# Patient Record
Sex: MALE | Race: BLACK OR AFRICAN AMERICAN | NOT HISPANIC OR LATINO | URBAN - METROPOLITAN AREA
[De-identification: names, ages, dates, MRNs, and addresses within clinical notes are randomized per-mention and may not be internally consistent; named-entity substitution may affect disease eponyms.]

---

## 2024-01-01 ENCOUNTER — SOCIAL WORK (OUTPATIENT)
Dept: ADMINISTRATIVE | Facility: OTHER | Age: 44
End: 2024-01-01
Payer: MEDICAID

## 2024-01-01 ENCOUNTER — TELEPHONE (OUTPATIENT)
Dept: HEPATOLOGY | Facility: CLINIC | Age: 44
End: 2024-01-01
Payer: MEDICAID

## 2024-01-01 ENCOUNTER — HOSPITAL ENCOUNTER (INPATIENT)
Facility: HOSPITAL | Age: 44
LOS: 11 days | Discharge: HOME | DRG: 840 | End: 2024-03-28
Attending: INTERNAL MEDICINE | Admitting: INTERNAL MEDICINE
Payer: MEDICAID

## 2024-01-01 ENCOUNTER — TELEPHONE (OUTPATIENT)
Dept: INTERVENTIONAL RADIOLOGY/VASCULAR | Facility: CLINIC | Age: 44
End: 2024-01-01
Payer: MEDICAID

## 2024-01-01 ENCOUNTER — DOCUMENTATION ONLY (OUTPATIENT)
Dept: HEMATOLOGY/ONCOLOGY | Facility: CLINIC | Age: 44
End: 2024-01-01
Payer: MEDICAID

## 2024-01-01 ENCOUNTER — DOCUMENTATION ONLY (OUTPATIENT)
Dept: ONCOLOGY | Facility: HOSPITAL | Age: 44
End: 2024-01-01
Payer: MEDICAID

## 2024-01-01 VITALS
HEIGHT: 71 IN | WEIGHT: 154.63 LBS | RESPIRATION RATE: 18 BRPM | TEMPERATURE: 98 F | OXYGEN SATURATION: 100 % | SYSTOLIC BLOOD PRESSURE: 142 MMHG | HEART RATE: 89 BPM | BODY MASS INDEX: 21.65 KG/M2 | DIASTOLIC BLOOD PRESSURE: 90 MMHG

## 2024-01-01 DIAGNOSIS — R00.0 TACHYCARDIA: ICD-10-CM

## 2024-01-01 DIAGNOSIS — B16.9 ACUTE VIRAL HEPATITIS B WITHOUT COMA AND WITHOUT DELTA AGENT: Primary | ICD-10-CM

## 2024-01-01 DIAGNOSIS — E87.5 HYPERKALEMIA: ICD-10-CM

## 2024-01-01 DIAGNOSIS — C95.90 LEUKEMIA: ICD-10-CM

## 2024-01-01 DIAGNOSIS — C91.50 ADULT T-CELL LEUKEMIA/LYMPHOMA, NOT IN REMISSION: Primary | ICD-10-CM

## 2024-01-01 DIAGNOSIS — R07.9 CHEST PAIN: ICD-10-CM

## 2024-01-01 DIAGNOSIS — C91.50: ICD-10-CM

## 2024-01-01 DIAGNOSIS — C91.50 ADULT T-CELL LEUKEMIA/LYMPHOMA, NOT IN REMISSION: ICD-10-CM

## 2024-01-01 DIAGNOSIS — C95.00 ACUTE LEUKEMIA NOT HAVING ACHIEVED REMISSION: Primary | ICD-10-CM

## 2024-01-01 DIAGNOSIS — C95.00 ACUTE LEUKEMIA: ICD-10-CM

## 2024-01-01 DIAGNOSIS — B18.1 CHRONIC VIRAL HEPATITIS B WITHOUT DELTA AGENT AND WITHOUT COMA: ICD-10-CM

## 2024-01-01 LAB
ABO + RH BLD: NORMAL
AFP SERPL-MCNC: 2.9 NG/ML (ref 0–8.4)
ALBUMIN SERPL BCP-MCNC: 2.6 G/DL (ref 3.5–5.2)
ALBUMIN SERPL BCP-MCNC: 2.7 G/DL (ref 3.5–5.2)
ALBUMIN SERPL BCP-MCNC: 2.8 G/DL (ref 3.5–5.2)
ALBUMIN SERPL BCP-MCNC: 2.9 G/DL (ref 3.5–5.2)
ALBUMIN SERPL BCP-MCNC: 3.1 G/DL (ref 3.5–5.2)
ALLENS TEST: ABNORMAL
ALP SERPL-CCNC: 147 U/L (ref 55–135)
ALP SERPL-CCNC: 149 U/L (ref 55–135)
ALP SERPL-CCNC: 160 U/L (ref 55–135)
ALP SERPL-CCNC: 173 U/L (ref 55–135)
ALP SERPL-CCNC: 188 U/L (ref 55–135)
ALP SERPL-CCNC: 197 U/L (ref 55–135)
ALP SERPL-CCNC: 204 U/L (ref 55–135)
ALP SERPL-CCNC: 210 U/L (ref 55–135)
ALP SERPL-CCNC: 212 U/L (ref 55–135)
ALP SERPL-CCNC: 268 U/L (ref 55–135)
ALP SERPL-CCNC: 276 U/L (ref 55–135)
ALP SERPL-CCNC: 290 U/L (ref 55–135)
ALP SERPL-CCNC: 295 U/L (ref 55–135)
ALP SERPL-CCNC: 313 U/L (ref 55–135)
ALT SERPL W/O P-5'-P-CCNC: 158 U/L (ref 10–44)
ALT SERPL W/O P-5'-P-CCNC: 38 U/L (ref 10–44)
ALT SERPL W/O P-5'-P-CCNC: 38 U/L (ref 10–44)
ALT SERPL W/O P-5'-P-CCNC: 48 U/L (ref 10–44)
ALT SERPL W/O P-5'-P-CCNC: 49 U/L (ref 10–44)
ALT SERPL W/O P-5'-P-CCNC: 50 U/L (ref 10–44)
ALT SERPL W/O P-5'-P-CCNC: 57 U/L (ref 10–44)
ALT SERPL W/O P-5'-P-CCNC: 62 U/L (ref 10–44)
ALT SERPL W/O P-5'-P-CCNC: 67 U/L (ref 10–44)
ALT SERPL W/O P-5'-P-CCNC: 69 U/L (ref 10–44)
ALT SERPL W/O P-5'-P-CCNC: 78 U/L (ref 10–44)
ALT SERPL W/O P-5'-P-CCNC: 84 U/L (ref 10–44)
ALT SERPL W/O P-5'-P-CCNC: 91 U/L (ref 10–44)
ALT SERPL W/O P-5'-P-CCNC: 97 U/L (ref 10–44)
AML FISH REASON FOR REFERRAL (BLD): NORMAL
ANION GAP SERPL CALC-SCNC: 11 MMOL/L (ref 8–16)
ANION GAP SERPL CALC-SCNC: 13 MMOL/L (ref 8–16)
ANION GAP SERPL CALC-SCNC: 3 MMOL/L (ref 8–16)
ANION GAP SERPL CALC-SCNC: 4 MMOL/L (ref 8–16)
ANION GAP SERPL CALC-SCNC: 4 MMOL/L (ref 8–16)
ANION GAP SERPL CALC-SCNC: 5 MMOL/L (ref 8–16)
ANION GAP SERPL CALC-SCNC: 5 MMOL/L (ref 8–16)
ANION GAP SERPL CALC-SCNC: 7 MMOL/L (ref 8–16)
ANION GAP SERPL CALC-SCNC: 7 MMOL/L (ref 8–16)
ANION GAP SERPL CALC-SCNC: 8 MMOL/L (ref 8–16)
ANION GAP SERPL CALC-SCNC: 8 MMOL/L (ref 8–16)
ANION GAP SERPL CALC-SCNC: 9 MMOL/L (ref 8–16)
ANISOCYTOSIS BLD QL SMEAR: SLIGHT
ANNOTATION COMMENT IMP: NORMAL
ANNOTATION COMMENT IMP: NORMAL
APTT PPP: 21.1 SEC (ref 21–32)
APTT PPP: 22.1 SEC (ref 21–32)
APTT PPP: 22.1 SEC (ref 21–32)
APTT PPP: 24.7 SEC (ref 21–32)
APTT PPP: 27.5 SEC (ref 21–32)
APTT PPP: 28.6 SEC (ref 21–32)
APTT PPP: 29.2 SEC (ref 21–32)
APTT PPP: 37.3 SEC (ref 21–32)
ASCENDING AORTA: 3.76 CM
AST SERPL-CCNC: 100 U/L (ref 10–40)
AST SERPL-CCNC: 104 U/L (ref 10–40)
AST SERPL-CCNC: 136 U/L (ref 10–40)
AST SERPL-CCNC: 214 U/L (ref 10–40)
AST SERPL-CCNC: 252 U/L (ref 10–40)
AST SERPL-CCNC: 303 U/L (ref 10–40)
AST SERPL-CCNC: 303 U/L (ref 10–40)
AST SERPL-CCNC: 69 U/L (ref 10–40)
AST SERPL-CCNC: 69 U/L (ref 10–40)
AST SERPL-CCNC: 71 U/L (ref 10–40)
AST SERPL-CCNC: 73 U/L (ref 10–40)
AST SERPL-CCNC: 75 U/L (ref 10–40)
AST SERPL-CCNC: 76 U/L (ref 10–40)
AST SERPL-CCNC: 92 U/L (ref 10–40)
AV INDEX (PROSTH): 0.82
AV MEAN GRADIENT: 6 MMHG
AV PEAK GRADIENT: 12 MMHG
AV VALVE AREA BY VELOCITY RATIO: 3.65 CM²
AV VALVE AREA: 3.68 CM²
AV VELOCITY RATIO: 0.81
BASOPHILS # BLD AUTO: ABNORMAL K/UL (ref 0–0.2)
BASOPHILS NFR BLD: 0 % (ref 0–1.9)
BILIRUB DIRECT SERPL-MCNC: 0.6 MG/DL (ref 0.1–0.3)
BILIRUB DIRECT SERPL-MCNC: 0.8 MG/DL (ref 0.1–0.3)
BILIRUB DIRECT SERPL-MCNC: 0.8 MG/DL (ref 0.1–0.3)
BILIRUB DIRECT SERPL-MCNC: 1.3 MG/DL (ref 0.1–0.3)
BILIRUB SERPL-MCNC: 1 MG/DL (ref 0.1–1)
BILIRUB SERPL-MCNC: 1.1 MG/DL (ref 0.1–1)
BILIRUB SERPL-MCNC: 1.2 MG/DL (ref 0.1–1)
BILIRUB SERPL-MCNC: 1.3 MG/DL (ref 0.1–1)
BILIRUB SERPL-MCNC: 1.4 MG/DL (ref 0.1–1)
BILIRUB SERPL-MCNC: 1.5 MG/DL (ref 0.1–1)
BILIRUB SERPL-MCNC: 1.5 MG/DL (ref 0.1–1)
BILIRUB SERPL-MCNC: 1.8 MG/DL (ref 0.1–1)
BILIRUB SERPL-MCNC: 1.8 MG/DL (ref 0.1–1)
BILIRUB SERPL-MCNC: 2 MG/DL (ref 0.1–1)
BILIRUB SERPL-MCNC: 2.3 MG/DL (ref 0.1–1)
BILIRUB SERPL-MCNC: 2.4 MG/DL (ref 0.1–1)
BLD GP AB SCN CELLS X3 SERPL QL: NORMAL
BLD PROD TYP BPU: NORMAL
BLD PROD TYP BPU: NORMAL
BLOOD UNIT EXPIRATION DATE: NORMAL
BLOOD UNIT EXPIRATION DATE: NORMAL
BLOOD UNIT TYPE CODE: 5100
BLOOD UNIT TYPE CODE: 5100
BLOOD UNIT TYPE: NORMAL
BLOOD UNIT TYPE: NORMAL
BODY SITE - BONE MARROW: NORMAL
BUN SERPL-MCNC: 10 MG/DL (ref 6–20)
BUN SERPL-MCNC: 13 MG/DL (ref 6–20)
BUN SERPL-MCNC: 15 MG/DL (ref 6–20)
BUN SERPL-MCNC: 17 MG/DL (ref 6–20)
BUN SERPL-MCNC: 18 MG/DL (ref 6–20)
BUN SERPL-MCNC: 19 MG/DL (ref 6–20)
BUN SERPL-MCNC: 20 MG/DL (ref 6–20)
BUN SERPL-MCNC: 21 MG/DL (ref 6–20)
BUN SERPL-MCNC: 22 MG/DL (ref 6–20)
BUN SERPL-MCNC: 23 MG/DL (ref 6–20)
BUN SERPL-MCNC: 28 MG/DL (ref 6–20)
BUN SERPL-MCNC: 33 MG/DL (ref 6–20)
BUN SERPL-MCNC: 38 MG/DL (ref 6–20)
BUN SERPL-MCNC: 6 MG/DL (ref 6–20)
BUN SERPL-MCNC: 8 MG/DL (ref 6–20)
BURR CELLS BLD QL SMEAR: ABNORMAL
BURR CELLS BLD QL SMEAR: ABNORMAL
CALCIUM SERPL-MCNC: 10.1 MG/DL (ref 8.7–10.5)
CALCIUM SERPL-MCNC: 10.4 MG/DL (ref 8.7–10.5)
CALCIUM SERPL-MCNC: 13 MG/DL (ref 8.7–10.5)
CALCIUM SERPL-MCNC: 6.2 MG/DL (ref 8.7–10.5)
CALCIUM SERPL-MCNC: 7.2 MG/DL (ref 8.7–10.5)
CALCIUM SERPL-MCNC: 7.2 MG/DL (ref 8.7–10.5)
CALCIUM SERPL-MCNC: 7.9 MG/DL (ref 8.7–10.5)
CALCIUM SERPL-MCNC: 8.5 MG/DL (ref 8.7–10.5)
CALCIUM SERPL-MCNC: 8.5 MG/DL (ref 8.7–10.5)
CALCIUM SERPL-MCNC: 8.8 MG/DL (ref 8.7–10.5)
CALCIUM SERPL-MCNC: 8.9 MG/DL (ref 8.7–10.5)
CALCIUM SERPL-MCNC: 9 MG/DL (ref 8.7–10.5)
CALCIUM SERPL-MCNC: 9 MG/DL (ref 8.7–10.5)
CALCIUM SERPL-MCNC: 9.4 MG/DL (ref 8.7–10.5)
CALCIUM SERPL-MCNC: 9.8 MG/DL (ref 8.7–10.5)
CALCIUM SERPL-MCNC: 9.9 MG/DL (ref 8.7–10.5)
CELLS W CYTOGENETIC ABNL BLD/T: NORMAL
CHLORIDE SERPL-SCNC: 101 MMOL/L (ref 95–110)
CHLORIDE SERPL-SCNC: 102 MMOL/L (ref 95–110)
CHLORIDE SERPL-SCNC: 104 MMOL/L (ref 95–110)
CHLORIDE SERPL-SCNC: 107 MMOL/L (ref 95–110)
CHLORIDE SERPL-SCNC: 108 MMOL/L (ref 95–110)
CHLORIDE SERPL-SCNC: 109 MMOL/L (ref 95–110)
CHLORIDE SERPL-SCNC: 113 MMOL/L (ref 95–110)
CHLORIDE SERPL-SCNC: 93 MMOL/L (ref 95–110)
CHLORIDE SERPL-SCNC: 95 MMOL/L (ref 95–110)
CHLORIDE SERPL-SCNC: 95 MMOL/L (ref 95–110)
CHLORIDE SERPL-SCNC: 98 MMOL/L (ref 95–110)
CHLORIDE SERPL-SCNC: 98 MMOL/L (ref 95–110)
CHLORIDE SERPL-SCNC: 99 MMOL/L (ref 95–110)
CHROM ANALY RESULT (ISCN): NORMAL
CHROM BANDING METHOD: NORMAL
CHROMOSOME ANALYSIS BM ADDITIONAL INFORMATION: NORMAL
CHROMOSOME ANALYSIS BM RELEASED BY: NORMAL
CHROMOSOME ANALYSIS BM RESULT SUMMARY: NORMAL
CLINICAL BIOCHEMIST REVIEW: NORMAL
CLINICAL CYTOGENETICIST REVIEW: NORMAL
CLINICAL CYTOGENETICIST REVIEW: NORMAL
CLINICAL DIAGNOSIS - BONE MARROW: NORMAL
CO2 SERPL-SCNC: 21 MMOL/L (ref 23–29)
CO2 SERPL-SCNC: 21 MMOL/L (ref 23–29)
CO2 SERPL-SCNC: 22 MMOL/L (ref 23–29)
CO2 SERPL-SCNC: 23 MMOL/L (ref 23–29)
CO2 SERPL-SCNC: 25 MMOL/L (ref 23–29)
CO2 SERPL-SCNC: 25 MMOL/L (ref 23–29)
CO2 SERPL-SCNC: 26 MMOL/L (ref 23–29)
CO2 SERPL-SCNC: 27 MMOL/L (ref 23–29)
CO2 SERPL-SCNC: 28 MMOL/L (ref 23–29)
CO2 SERPL-SCNC: 30 MMOL/L (ref 23–29)
CO2 SERPL-SCNC: 30 MMOL/L (ref 23–29)
CO2 SERPL-SCNC: 31 MMOL/L (ref 23–29)
CODING SYSTEM: NORMAL
CODING SYSTEM: NORMAL
COMMENT: NORMAL
CREAT SERPL-MCNC: 0.6 MG/DL (ref 0.5–1.4)
CREAT SERPL-MCNC: 0.7 MG/DL (ref 0.5–1.4)
CREAT SERPL-MCNC: 0.8 MG/DL (ref 0.5–1.4)
CROSSMATCH INTERPRETATION: NORMAL
CROSSMATCH INTERPRETATION: NORMAL
CV ECHO LV RWT: 0.25 CM
DELSYS: ABNORMAL
DIFFERENTIAL METHOD BLD: ABNORMAL
DISPENSE STATUS: NORMAL
DISPENSE STATUS: NORMAL
DOP CALC AO PEAK VEL: 1.72 M/S
DOP CALC AO VTI: 29.06 CM
DOP CALC LVOT AREA: 4.5 CM2
DOP CALC LVOT DIAMETER: 2.39 CM
DOP CALC LVOT PEAK VEL: 1.4 M/S
DOP CALC LVOT STROKE VOLUME: 106.81 CM3
DOP CALCLVOT PEAK VEL VTI: 23.82 CM
DX: NORMAL
E WAVE DECELERATION TIME: 154.51 MSEC
E/A RATIO: 1.45
E/E' RATIO: 7.75 M/S
ECHO LV POSTERIOR WALL: 0.63 CM (ref 0.6–1.1)
EOSINOPHIL # BLD AUTO: ABNORMAL K/UL (ref 0–0.5)
EOSINOPHIL NFR BLD: 0 % (ref 0–8)
EOSINOPHIL NFR BLD: 1 % (ref 0–8)
EOSINOPHIL NFR BLD: 2 % (ref 0–8)
EOSINOPHIL NFR BLD: 3 % (ref 0–8)
EOSINOPHIL NFR BLD: 4 % (ref 0–8)
EOSINOPHIL NFR BLD: 4 % (ref 0–8)
EOSINOPHIL NFR BLD: 6 % (ref 0–8)
EOSINOPHIL NFR BLD: 7 % (ref 0–8)
ERYTHROCYTE [DISTWIDTH] IN BLOOD BY AUTOMATED COUNT: 13.6 % (ref 11.5–14.5)
ERYTHROCYTE [DISTWIDTH] IN BLOOD BY AUTOMATED COUNT: 13.8 % (ref 11.5–14.5)
ERYTHROCYTE [DISTWIDTH] IN BLOOD BY AUTOMATED COUNT: 13.8 % (ref 11.5–14.5)
ERYTHROCYTE [DISTWIDTH] IN BLOOD BY AUTOMATED COUNT: 13.9 % (ref 11.5–14.5)
ERYTHROCYTE [DISTWIDTH] IN BLOOD BY AUTOMATED COUNT: 14 % (ref 11.5–14.5)
ERYTHROCYTE [DISTWIDTH] IN BLOOD BY AUTOMATED COUNT: 14.1 % (ref 11.5–14.5)
ERYTHROCYTE [DISTWIDTH] IN BLOOD BY AUTOMATED COUNT: 14.2 % (ref 11.5–14.5)
ERYTHROCYTE [DISTWIDTH] IN BLOOD BY AUTOMATED COUNT: 14.6 % (ref 11.5–14.5)
ERYTHROCYTE [DISTWIDTH] IN BLOOD BY AUTOMATED COUNT: 14.6 % (ref 11.5–14.5)
ERYTHROCYTE [DISTWIDTH] IN BLOOD BY AUTOMATED COUNT: 14.7 % (ref 11.5–14.5)
ERYTHROCYTE [DISTWIDTH] IN BLOOD BY AUTOMATED COUNT: 14.8 % (ref 11.5–14.5)
ERYTHROCYTE [DISTWIDTH] IN BLOOD BY AUTOMATED COUNT: 15.2 % (ref 11.5–14.5)
ERYTHROCYTE [DISTWIDTH] IN BLOOD BY AUTOMATED COUNT: 15.5 % (ref 11.5–14.5)
EST. GFR  (NO RACE VARIABLE): >60 ML/MIN/1.73 M^2
FIBRINOGEN PPP-MCNC: 125 MG/DL (ref 182–400)
FIBRINOGEN PPP-MCNC: 150 MG/DL (ref 182–400)
FIBRINOGEN PPP-MCNC: 155 MG/DL (ref 182–400)
FIBRINOGEN PPP-MCNC: 160 MG/DL (ref 182–400)
FIBRINOGEN PPP-MCNC: 162 MG/DL (ref 182–400)
FIBRINOGEN PPP-MCNC: 174 MG/DL (ref 182–400)
FIBRINOGEN PPP-MCNC: 183 MG/DL (ref 182–400)
FIBRINOGEN PPP-MCNC: 185 MG/DL (ref 182–400)
FIBRINOGEN PPP-MCNC: 188 MG/DL (ref 182–400)
FIBRINOGEN PPP-MCNC: 195 MG/DL (ref 182–400)
FIBRINOGEN PPP-MCNC: 203 MG/DL (ref 182–400)
FIBRINOGEN PPP-MCNC: 206 MG/DL (ref 182–400)
FINAL PATHOLOGIC DIAGNOSIS: NORMAL
FIO2: 21
FIO2: 21
FLOW CYTOMETRY ANTIBODIES ANALYZED - BONE MARROW: NORMAL
FLOW CYTOMETRY COMMENT - BONE MARROW: NORMAL
FLOW CYTOMETRY INTERPRETATION - BONE MARROW: NORMAL
FLT3 RESULT: NORMAL
FRACTIONAL SHORTENING: 32 % (ref 28–44)
G6PD RBC-CCNT: 1 U/G HB (ref 8–11.9)
GLOBAL LONGITUIDAL STRAIN: 20 %
GLUCOSE SERPL-MCNC: 105 MG/DL (ref 70–110)
GLUCOSE SERPL-MCNC: 105 MG/DL (ref 70–110)
GLUCOSE SERPL-MCNC: 111 MG/DL (ref 70–110)
GLUCOSE SERPL-MCNC: 115 MG/DL (ref 70–110)
GLUCOSE SERPL-MCNC: 130 MG/DL (ref 70–110)
GLUCOSE SERPL-MCNC: 142 MG/DL (ref 70–110)
GLUCOSE SERPL-MCNC: 142 MG/DL (ref 70–110)
GLUCOSE SERPL-MCNC: 144 MG/DL (ref 70–110)
GLUCOSE SERPL-MCNC: 156 MG/DL (ref 70–110)
GLUCOSE SERPL-MCNC: 168 MG/DL (ref 70–110)
GLUCOSE SERPL-MCNC: 194 MG/DL (ref 70–110)
GLUCOSE SERPL-MCNC: 41 MG/DL (ref 70–110)
GLUCOSE SERPL-MCNC: 62 MG/DL (ref 70–110)
GLUCOSE SERPL-MCNC: 73 MG/DL (ref 70–110)
GLUCOSE SERPL-MCNC: 75 MG/DL (ref 70–110)
GLUCOSE SERPL-MCNC: 78 MG/DL (ref 70–110)
GLUCOSE SERPL-MCNC: 80 MG/DL (ref 70–110)
GLUCOSE SERPL-MCNC: 81 MG/DL (ref 70–110)
GLUCOSE SERPL-MCNC: 88 MG/DL (ref 70–110)
GROSS: NORMAL
HAV IGG SER QL IA: REACTIVE
HAV IGM SERPL QL IA: NORMAL
HBV CORE AB SERPL QL IA: REACTIVE
HBV E AB SER QL: REACTIVE
HBV E AG SERPL QL IA: NONREACTIVE
HBV SURFACE AG SERPL QL IA: REACTIVE
HBV SURFACE AG SERPL QL NT: ABNORMAL
HCO3 UR-SCNC: 23.3 MMOL/L (ref 24–28)
HCO3 UR-SCNC: 23.3 MMOL/L (ref 24–28)
HCO3 UR-SCNC: 24.7 MMOL/L (ref 24–28)
HCO3 UR-SCNC: 24.7 MMOL/L (ref 24–28)
HCO3 UR-SCNC: 25.5 MMOL/L (ref 24–28)
HCT VFR BLD AUTO: 29.3 % (ref 40–54)
HCT VFR BLD AUTO: 30.1 % (ref 40–54)
HCT VFR BLD AUTO: 32 % (ref 40–54)
HCT VFR BLD AUTO: 32.1 % (ref 40–54)
HCT VFR BLD AUTO: 32.8 % (ref 40–54)
HCT VFR BLD AUTO: 35.1 % (ref 40–54)
HCT VFR BLD AUTO: 37.2 % (ref 40–54)
HCT VFR BLD AUTO: 37.9 % (ref 40–54)
HCT VFR BLD AUTO: 38.6 % (ref 40–54)
HCT VFR BLD AUTO: 39.3 % (ref 40–54)
HCT VFR BLD AUTO: 40.3 % (ref 40–54)
HCT VFR BLD AUTO: 41.7 % (ref 40–54)
HCT VFR BLD AUTO: 46.7 % (ref 40–54)
HCT VFR BLD CALC: 36 %PCV (ref 36–54)
HCT VFR BLD CALC: 37 %PCV (ref 36–54)
HCT VFR BLD CALC: 37 %PCV (ref 36–54)
HCV AB SERPL QL IA: NORMAL
HDV AB SER QL IA: NEGATIVE
HDV AB SER QL IA: NEGATIVE
HEPATITIS B VIRUS DNA: ABNORMAL
HEPATITIS B VIRUS LOG (IU/ML): 2.32 LOGIU/ML
HEPATITIS B VIRUS PCR, QUANT: 209 IU/ML
HGB BLD-MCNC: 10.5 G/DL (ref 14–18)
HGB BLD-MCNC: 10.5 G/DL (ref 14–18)
HGB BLD-MCNC: 10.7 G/DL (ref 14–18)
HGB BLD-MCNC: 11.5 G/DL (ref 14–18)
HGB BLD-MCNC: 12.1 G/DL (ref 14–18)
HGB BLD-MCNC: 12.3 G/DL (ref 14–18)
HGB BLD-MCNC: 12.4 G/DL (ref 14–18)
HGB BLD-MCNC: 12.9 G/DL (ref 14–18)
HGB BLD-MCNC: 13 G/DL (ref 14–18)
HGB BLD-MCNC: 13.4 G/DL (ref 14–18)
HGB BLD-MCNC: 15 G/DL (ref 14–18)
HGB BLD-MCNC: 9.5 G/DL (ref 14–18)
HGB BLD-MCNC: 9.8 G/DL (ref 14–18)
HIV 1+2 AB+HIV1 P24 AG SERPL QL IA: NORMAL
HTLV-I DNA: DETECTED
HTLV-II DNA: NOT DETECTED
HYPOCHROMIA BLD QL SMEAR: ABNORMAL
IMM GRANULOCYTES # BLD AUTO: ABNORMAL K/UL (ref 0–0.04)
IMM GRANULOCYTES NFR BLD AUTO: ABNORMAL % (ref 0–0.5)
INR PPP: 1.2 (ref 0.8–1.2)
INR PPP: 1.2 (ref 0.8–1.2)
INR PPP: 1.3 (ref 0.8–1.2)
INR PPP: 1.4 (ref 0.8–1.2)
INR PPP: 1.5 (ref 0.8–1.2)
INR PPP: 1.5 (ref 0.8–1.2)
INR PPP: 1.6 (ref 0.8–1.2)
INR PPP: 3.8 (ref 0.8–1.2)
INTERVENTRICULAR SEPTUM: 0.96 CM (ref 0.6–1.1)
KARYOTYP MAR: NORMAL
LA MAJOR: 4.91 CM
LA MINOR: 5.02 CM
LA WIDTH: 4.98 CM
LAB TEST METHOD: NORMAL
LACTATE SERPL-SCNC: 1.8 MMOL/L (ref 0.5–2.2)
LDH SERPL L TO P-CCNC: 1450 U/L (ref 110–260)
LDH SERPL L TO P-CCNC: 1467 U/L (ref 110–260)
LDH SERPL L TO P-CCNC: 1471 U/L (ref 110–260)
LDH SERPL L TO P-CCNC: 1754 U/L (ref 110–260)
LDH SERPL L TO P-CCNC: 2498 U/L (ref 110–260)
LDH SERPL L TO P-CCNC: 2956 U/L (ref 110–260)
LDH SERPL L TO P-CCNC: 3067 U/L (ref 110–260)
LDH SERPL L TO P-CCNC: 4092 U/L (ref 110–260)
LDH SERPL L TO P-CCNC: 5315 U/L (ref 110–260)
LDH SERPL L TO P-CCNC: 6855 U/L (ref 110–260)
LDH SERPL L TO P-CCNC: 8069 U/L (ref 110–260)
LEFT ATRIUM SIZE: 3.64 CM
LEFT ATRIUM VOLUME MOD: 67.55 CM3
LEFT ATRIUM VOLUME: 76.49 CM3
LEFT INTERNAL DIMENSION IN SYSTOLE: 3.4 CM (ref 2.1–4)
LEFT VENTRICLE DIASTOLIC VOLUME: 116.68 ML
LEFT VENTRICLE SYSTOLIC VOLUME: 47.54 ML
LEFT VENTRICULAR INTERNAL DIMENSION IN DIASTOLE: 4.97 CM (ref 3.5–6)
LEFT VENTRICULAR MASS: 133.33 G
LV LATERAL E/E' RATIO: 7.75 M/S
LV SEPTAL E/E' RATIO: 7.75 M/S
LYMPHOCYTES # BLD AUTO: ABNORMAL K/UL (ref 1–4.8)
LYMPHOCYTES NFR BLD: 11 % (ref 18–48)
LYMPHOCYTES NFR BLD: 11 % (ref 18–48)
LYMPHOCYTES NFR BLD: 13 % (ref 18–48)
LYMPHOCYTES NFR BLD: 13 % (ref 18–48)
LYMPHOCYTES NFR BLD: 16 % (ref 18–48)
LYMPHOCYTES NFR BLD: 17 % (ref 18–48)
LYMPHOCYTES NFR BLD: 19 % (ref 18–48)
LYMPHOCYTES NFR BLD: 4 % (ref 18–48)
LYMPHOCYTES NFR BLD: 4 % (ref 18–48)
LYMPHOCYTES NFR BLD: 6 % (ref 18–48)
LYMPHOCYTES NFR BLD: 7 % (ref 18–48)
LYMPHOCYTES NFR BLD: 7 % (ref 18–48)
LYMPHOCYTES NFR BLD: 8 % (ref 18–48)
Lab: NORMAL
MAGNESIUM SERPL-MCNC: 1.2 MG/DL (ref 1.6–2.6)
MAGNESIUM SERPL-MCNC: 1.2 MG/DL (ref 1.6–2.6)
MAGNESIUM SERPL-MCNC: 1.3 MG/DL (ref 1.6–2.6)
MAGNESIUM SERPL-MCNC: 1.5 MG/DL (ref 1.6–2.6)
MAGNESIUM SERPL-MCNC: 1.6 MG/DL (ref 1.6–2.6)
MAGNESIUM SERPL-MCNC: 1.6 MG/DL (ref 1.6–2.6)
MAGNESIUM SERPL-MCNC: 1.7 MG/DL (ref 1.6–2.6)
MAGNESIUM SERPL-MCNC: 1.8 MG/DL (ref 1.6–2.6)
MAGNESIUM SERPL-MCNC: 1.8 MG/DL (ref 1.6–2.6)
MCH RBC QN AUTO: 27.2 PG (ref 27–31)
MCH RBC QN AUTO: 27.2 PG (ref 27–31)
MCH RBC QN AUTO: 27.6 PG (ref 27–31)
MCH RBC QN AUTO: 27.7 PG (ref 27–31)
MCH RBC QN AUTO: 27.7 PG (ref 27–31)
MCH RBC QN AUTO: 27.8 PG (ref 27–31)
MCH RBC QN AUTO: 27.8 PG (ref 27–31)
MCH RBC QN AUTO: 27.9 PG (ref 27–31)
MCHC RBC AUTO-ENTMCNC: 32.1 G/DL (ref 32–36)
MCHC RBC AUTO-ENTMCNC: 32.3 G/DL (ref 32–36)
MCHC RBC AUTO-ENTMCNC: 32.4 G/DL (ref 32–36)
MCHC RBC AUTO-ENTMCNC: 32.5 G/DL (ref 32–36)
MCHC RBC AUTO-ENTMCNC: 32.5 G/DL (ref 32–36)
MCHC RBC AUTO-ENTMCNC: 32.6 G/DL (ref 32–36)
MCHC RBC AUTO-ENTMCNC: 32.6 G/DL (ref 32–36)
MCHC RBC AUTO-ENTMCNC: 32.7 G/DL (ref 32–36)
MCHC RBC AUTO-ENTMCNC: 32.8 G/DL (ref 32–36)
MCV RBC AUTO: 84 FL (ref 82–98)
MCV RBC AUTO: 84 FL (ref 82–98)
MCV RBC AUTO: 85 FL (ref 82–98)
MCV RBC AUTO: 86 FL (ref 82–98)
MICROSCOPIC EXAM: NORMAL
MODE: ABNORMAL
MOL DX INTERP BLD/T QL: NORMAL
MONOCYTES # BLD AUTO: ABNORMAL K/UL (ref 0.3–1)
MONOCYTES NFR BLD: 10 % (ref 4–15)
MONOCYTES NFR BLD: 12 % (ref 4–15)
MONOCYTES NFR BLD: 19 % (ref 4–15)
MONOCYTES NFR BLD: 2 % (ref 4–15)
MONOCYTES NFR BLD: 22 % (ref 4–15)
MONOCYTES NFR BLD: 3 % (ref 4–15)
MONOCYTES NFR BLD: 4 % (ref 4–15)
MONOCYTES NFR BLD: 4 % (ref 4–15)
MONOCYTES NFR BLD: 5 % (ref 4–15)
MONOCYTES NFR BLD: 8 % (ref 4–15)
MONOCYTES NFR BLD: 9 % (ref 4–15)
MV A" WAVE DURATION": 8.75 MSEC
MV PEAK A VEL: 0.64 M/S
MV PEAK E VEL: 0.93 M/S
MYELOCYTES NFR BLD MANUAL: 1 %
MYELOCYTES NFR BLD MANUAL: 1 %
NEUTROPHILS # BLD AUTO: ABNORMAL K/UL (ref 1.8–7.7)
NEUTROPHILS NFR BLD: 18 % (ref 38–73)
NEUTROPHILS NFR BLD: 20 % (ref 38–73)
NEUTROPHILS NFR BLD: 22 % (ref 38–73)
NEUTROPHILS NFR BLD: 25 % (ref 38–73)
NEUTROPHILS NFR BLD: 28 % (ref 38–73)
NEUTROPHILS NFR BLD: 28 % (ref 38–73)
NEUTROPHILS NFR BLD: 31 % (ref 38–73)
NEUTROPHILS NFR BLD: 32 % (ref 38–73)
NEUTROPHILS NFR BLD: 36 % (ref 38–73)
NEUTROPHILS NFR BLD: 36 % (ref 38–73)
NEUTROPHILS NFR BLD: 39 % (ref 38–73)
NEUTROPHILS NFR BLD: 42 % (ref 38–73)
NEUTROPHILS NFR BLD: 49 % (ref 38–73)
NEUTS BAND NFR BLD MANUAL: 1 %
NEUTS BAND NFR BLD MANUAL: 4 %
NGS CLINCIAL TRIALS: NORMAL
NGS INTERPRETATION: NORMAL
NGS ONCOHEME PANEL GENE LIST: NORMAL
NGS PATHOGENIC MUTATIONS DETECTED: NORMAL
NGS REVIEWED BY:: NORMAL
NGS VARIANTS OF UNKNOWN SIGNIFICANCE: NORMAL
NGSHM RESULT, BLOOD: NORMAL
NPM1 SIGNING PATHOLOGIST: NORMAL
NPM1 SPECIMEN TYPE: NORMAL
NRBC BLD-RTO: 0 /100 WBC
OHS LV EJECTION FRACTION SIMPSONS BIPLANE MOD: 58 %
OHS QRS DURATION: 86 MS
OHS QRS DURATION: 94 MS
OHS QTC CALCULATION: 425 MS
OHS QTC CALCULATION: 438 MS
OVALOCYTES BLD QL SMEAR: ABNORMAL
PATH REPORT.FINAL DX SPEC: NORMAL
PATH REPORT.FINAL DX SPEC: NORMAL
PATH REV BLD -IMP: NORMAL
PATH REV BLD -IMP: NORMAL
PCO2 BLDA: 43.5 MMHG (ref 35–45)
PCO2 BLDA: 43.5 MMHG (ref 35–45)
PCO2 BLDA: 43.8 MMHG (ref 35–45)
PCO2 BLDA: 43.8 MMHG (ref 35–45)
PCO2 BLDA: 44.2 MMHG (ref 35–45)
PH SMN: 7.34 [PH] (ref 7.35–7.45)
PH SMN: 7.34 [PH] (ref 7.35–7.45)
PH SMN: 7.36 [PH] (ref 7.35–7.45)
PH SMN: 7.36 [PH] (ref 7.35–7.45)
PH SMN: 7.37 [PH] (ref 7.35–7.45)
PHOSPHATE SERPL-MCNC: 1.3 MG/DL (ref 2.7–4.5)
PHOSPHATE SERPL-MCNC: 1.5 MG/DL (ref 2.7–4.5)
PHOSPHATE SERPL-MCNC: 1.6 MG/DL (ref 2.7–4.5)
PHOSPHATE SERPL-MCNC: 1.8 MG/DL (ref 2.7–4.5)
PHOSPHATE SERPL-MCNC: 1.9 MG/DL (ref 2.7–4.5)
PHOSPHATE SERPL-MCNC: 2 MG/DL (ref 2.7–4.5)
PHOSPHATE SERPL-MCNC: 2.1 MG/DL (ref 2.7–4.5)
PHOSPHATE SERPL-MCNC: 2.1 MG/DL (ref 2.7–4.5)
PHOSPHATE SERPL-MCNC: 2.3 MG/DL (ref 2.7–4.5)
PHOSPHATE SERPL-MCNC: 2.4 MG/DL (ref 2.7–4.5)
PHOSPHATE SERPL-MCNC: 2.4 MG/DL (ref 2.7–4.5)
PHOSPHATE SERPL-MCNC: 2.7 MG/DL (ref 2.7–4.5)
PHOSPHATE SERPL-MCNC: 2.9 MG/DL (ref 2.7–4.5)
PHOSPHATE SERPL-MCNC: 3 MG/DL (ref 2.7–4.5)
PHOSPHATE SERPL-MCNC: 3.3 MG/DL (ref 2.7–4.5)
PHOSPHATE SERPL-MCNC: 3.5 MG/DL (ref 2.7–4.5)
PHOSPHATE SERPL-MCNC: 3.6 MG/DL (ref 2.7–4.5)
PHOSPHATE SERPL-MCNC: <1 MG/DL (ref 2.7–4.5)
PHOSPHATE SERPL-MCNC: <1 MG/DL (ref 2.7–4.5)
PISA TR MAX VEL: 1.99 M/S
PLATELET # BLD AUTO: 34 K/UL (ref 150–450)
PLATELET # BLD AUTO: 39 K/UL (ref 150–450)
PLATELET # BLD AUTO: 42 K/UL (ref 150–450)
PLATELET # BLD AUTO: 51 K/UL (ref 150–450)
PLATELET # BLD AUTO: 53 K/UL (ref 150–450)
PLATELET # BLD AUTO: 53 K/UL (ref 150–450)
PLATELET # BLD AUTO: 62 K/UL (ref 150–450)
PLATELET # BLD AUTO: 69 K/UL (ref 150–450)
PLATELET # BLD AUTO: 77 K/UL (ref 150–450)
PLATELET # BLD AUTO: 79 K/UL (ref 150–450)
PLATELET # BLD AUTO: 80 K/UL (ref 150–450)
PLATELET BLD QL SMEAR: ABNORMAL
PLPETH BLD-MCNC: 21 NG/ML
PMV BLD AUTO: 10.1 FL (ref 9.2–12.9)
PMV BLD AUTO: 10.3 FL (ref 9.2–12.9)
PMV BLD AUTO: 10.7 FL (ref 9.2–12.9)
PMV BLD AUTO: 10.8 FL (ref 9.2–12.9)
PMV BLD AUTO: 10.9 FL (ref 9.2–12.9)
PMV BLD AUTO: 11.4 FL (ref 9.2–12.9)
PMV BLD AUTO: 11.6 FL (ref 9.2–12.9)
PMV BLD AUTO: 11.7 FL (ref 9.2–12.9)
PMV BLD AUTO: 8.5 FL (ref 9.2–12.9)
PMV BLD AUTO: 8.8 FL (ref 9.2–12.9)
PMV BLD AUTO: ABNORMAL FL (ref 9.2–12.9)
PO2 BLDA: 35 MMHG (ref 40–60)
PO2 BLDA: 35 MMHG (ref 40–60)
PO2 BLDA: 38 MMHG (ref 40–60)
POC BE: -1 MMOL/L
POC BE: -1 MMOL/L
POC BE: -3 MMOL/L
POC BE: -3 MMOL/L
POC BE: 0 MMOL/L
POC IONIZED CALCIUM: 1.23 MMOL/L (ref 1.06–1.42)
POC IONIZED CALCIUM: 1.23 MMOL/L (ref 1.06–1.42)
POC IONIZED CALCIUM: 1.35 MMOL/L (ref 1.06–1.42)
POC IONIZED CALCIUM: 1.35 MMOL/L (ref 1.06–1.42)
POC IONIZED CALCIUM: 1.41 MMOL/L (ref 1.06–1.42)
POC SATURATED O2: 64 % (ref 95–100)
POC SATURATED O2: 64 % (ref 95–100)
POC SATURATED O2: 68 % (ref 95–100)
POC SATURATED O2: 68 % (ref 95–100)
POC SATURATED O2: 70 % (ref 95–100)
POC TCO2: 25 MMOL/L (ref 24–29)
POC TCO2: 25 MMOL/L (ref 24–29)
POC TCO2: 26 MMOL/L (ref 24–29)
POC TCO2: 26 MMOL/L (ref 24–29)
POC TCO2: 27 MMOL/L (ref 24–29)
POCT GLUCOSE: 106 MG/DL (ref 70–110)
POCT GLUCOSE: 117 MG/DL (ref 70–110)
POCT GLUCOSE: 130 MG/DL (ref 70–110)
POCT GLUCOSE: 151 MG/DL (ref 70–110)
POCT GLUCOSE: 48 MG/DL (ref 70–110)
POCT GLUCOSE: 82 MG/DL (ref 70–110)
POCT GLUCOSE: 88 MG/DL (ref 70–110)
POCT GLUCOSE: 95 MG/DL (ref 70–110)
POIKILOCYTOSIS BLD QL SMEAR: SLIGHT
POLYCHROMASIA BLD QL SMEAR: ABNORMAL
POPETH BLD-MCNC: 59 NG/ML
POTASSIUM BLD-SCNC: 4.5 MMOL/L (ref 3.5–5.1)
POTASSIUM BLD-SCNC: 4.5 MMOL/L (ref 3.5–5.1)
POTASSIUM BLD-SCNC: 5.2 MMOL/L (ref 3.5–5.1)
POTASSIUM BLD-SCNC: 5.6 MMOL/L (ref 3.5–5.1)
POTASSIUM BLD-SCNC: 5.6 MMOL/L (ref 3.5–5.1)
POTASSIUM SERPL-SCNC: 3.4 MMOL/L (ref 3.5–5.1)
POTASSIUM SERPL-SCNC: 3.4 MMOL/L (ref 3.5–5.1)
POTASSIUM SERPL-SCNC: 3.6 MMOL/L (ref 3.5–5.1)
POTASSIUM SERPL-SCNC: 3.7 MMOL/L (ref 3.5–5.1)
POTASSIUM SERPL-SCNC: 3.7 MMOL/L (ref 3.5–5.1)
POTASSIUM SERPL-SCNC: 3.8 MMOL/L (ref 3.5–5.1)
POTASSIUM SERPL-SCNC: 3.9 MMOL/L (ref 3.5–5.1)
POTASSIUM SERPL-SCNC: 3.9 MMOL/L (ref 3.5–5.1)
POTASSIUM SERPL-SCNC: 4.4 MMOL/L (ref 3.5–5.1)
POTASSIUM SERPL-SCNC: 4.6 MMOL/L (ref 3.5–5.1)
POTASSIUM SERPL-SCNC: 4.8 MMOL/L (ref 3.5–5.1)
POTASSIUM SERPL-SCNC: 5.4 MMOL/L (ref 3.5–5.1)
POTASSIUM SERPL-SCNC: 5.7 MMOL/L (ref 3.5–5.1)
POTASSIUM SERPL-SCNC: 5.9 MMOL/L (ref 3.5–5.1)
POTASSIUM SERPL-SCNC: 6.4 MMOL/L (ref 3.5–5.1)
POTASSIUM SERPL-SCNC: 7 MMOL/L (ref 3.5–5.1)
POTASSIUM SERPL-SCNC: 7.1 MMOL/L (ref 3.5–5.1)
PROT SERPL-MCNC: 4.7 G/DL (ref 6–8.4)
PROT SERPL-MCNC: 4.8 G/DL (ref 6–8.4)
PROT SERPL-MCNC: 4.8 G/DL (ref 6–8.4)
PROT SERPL-MCNC: 4.9 G/DL (ref 6–8.4)
PROT SERPL-MCNC: 5 G/DL (ref 6–8.4)
PROT SERPL-MCNC: 5.1 G/DL (ref 6–8.4)
PROT SERPL-MCNC: 5.1 G/DL (ref 6–8.4)
PROT SERPL-MCNC: 5.3 G/DL (ref 6–8.4)
PROT SERPL-MCNC: 5.8 G/DL (ref 6–8.4)
PROTHROMBIN TIME: 12.4 SEC (ref 9–12.5)
PROTHROMBIN TIME: 13 SEC (ref 9–12.5)
PROTHROMBIN TIME: 13.5 SEC (ref 9–12.5)
PROTHROMBIN TIME: 15.1 SEC (ref 9–12.5)
PROTHROMBIN TIME: 15.2 SEC (ref 9–12.5)
PROTHROMBIN TIME: 15.4 SEC (ref 9–12.5)
PROTHROMBIN TIME: 16.1 SEC (ref 9–12.5)
PROTHROMBIN TIME: 16.7 SEC (ref 9–12.5)
PROTHROMBIN TIME: 37.7 SEC (ref 9–12.5)
PROVIDER SIGNING NAME: NORMAL
PULM VEIN S/D RATIO: 1.98
PV PEAK D VEL: 0.4 M/S
PV PEAK S VEL: 0.79 M/S
RA MAJOR: 4.52 CM
RA PRESSURE ESTIMATED: 8 MMHG
RA WIDTH: 3.06 CM
RBC # BLD AUTO: 3.49 M/UL (ref 4.6–6.2)
RBC # BLD AUTO: 3.53 M/UL (ref 4.6–6.2)
RBC # BLD AUTO: 3.76 M/UL (ref 4.6–6.2)
RBC # BLD AUTO: 3.79 M/UL (ref 4.6–6.2)
RBC # BLD AUTO: 3.87 M/UL (ref 4.6–6.2)
RBC # BLD AUTO: 4.16 M/UL (ref 4.6–6.2)
RBC # BLD AUTO: 4.38 M/UL (ref 4.6–6.2)
RBC # BLD AUTO: 4.41 M/UL (ref 4.6–6.2)
RBC # BLD AUTO: 4.56 M/UL (ref 4.6–6.2)
RBC # BLD AUTO: 4.63 M/UL (ref 4.6–6.2)
RBC # BLD AUTO: 4.67 M/UL (ref 4.6–6.2)
RBC # BLD AUTO: 4.83 M/UL (ref 4.6–6.2)
RBC # BLD AUTO: 5.43 M/UL (ref 4.6–6.2)
REASON FOR REFERRAL (NARRATIVE): NORMAL
REF LAB TEST METHOD: NORMAL
REF LAB TEST METHOD: NORMAL
RIGHT VENTRICULAR END-DIASTOLIC DIMENSION: 3.05 CM
RV TB RVSP: 10 MMHG
SAMPLE: ABNORMAL
SCHISTOCYTES BLD QL SMEAR: ABNORMAL
SCHISTOCYTES BLD QL SMEAR: PRESENT
SCHISTOCYTES BLD QL SMEAR: PRESENT
SINUS: 3.5 CM
SITE: ABNORMAL
SMUDGE CELLS BLD QL SMEAR: PRESENT
SODIUM BLD-SCNC: 125 MMOL/L (ref 136–145)
SODIUM BLD-SCNC: 125 MMOL/L (ref 136–145)
SODIUM BLD-SCNC: 130 MMOL/L (ref 136–145)
SODIUM BLD-SCNC: 130 MMOL/L (ref 136–145)
SODIUM BLD-SCNC: 134 MMOL/L (ref 136–145)
SODIUM SERPL-SCNC: 129 MMOL/L (ref 136–145)
SODIUM SERPL-SCNC: 130 MMOL/L (ref 136–145)
SODIUM SERPL-SCNC: 131 MMOL/L (ref 136–145)
SODIUM SERPL-SCNC: 133 MMOL/L (ref 136–145)
SODIUM SERPL-SCNC: 134 MMOL/L (ref 136–145)
SODIUM SERPL-SCNC: 135 MMOL/L (ref 136–145)
SODIUM SERPL-SCNC: 135 MMOL/L (ref 136–145)
SODIUM SERPL-SCNC: 136 MMOL/L (ref 136–145)
SODIUM SERPL-SCNC: 138 MMOL/L (ref 136–145)
SODIUM SERPL-SCNC: 139 MMOL/L (ref 136–145)
SODIUM SERPL-SCNC: 139 MMOL/L (ref 136–145)
SP02: 95
SP02: 95
SPECIMEN OUTDATE: NORMAL
SPECIMEN SOURCE: NORMAL
SPECIMEN TYPE: NORMAL
SPECIMEN: NORMAL
SPHEROCYTES BLD QL SMEAR: ABNORMAL
STJ: 3.29 CM
SUPPLEMENTAL DIAGNOSIS: NORMAL
TASV: 26 CM/S
TDI LATERAL: 0.12 M/S
TDI SEPTAL: 0.12 M/S
TDI: 0.12 M/S
TEST PERFORMANCE INFO SPEC: NORMAL
TEST PERFORMANCE INFO SPEC: NORMAL
TOXIC GRANULES BLD QL SMEAR: PRESENT
TR MAX PG: 16 MMHG
TRICUSPID ANNULAR PLANE SYSTOLIC EXCURSION: 1.99 CM
TV REST PULMONARY ARTERY PRESSURE: 24 MMHG
UNIT NUMBER: NORMAL
UNIT NUMBER: NORMAL
URATE SERPL-MCNC: 11.6 MG/DL (ref 3.4–7)
URATE SERPL-MCNC: 3.2 MG/DL (ref 3.4–7)
URATE SERPL-MCNC: 3.2 MG/DL (ref 3.4–7)
URATE SERPL-MCNC: 3.3 MG/DL (ref 3.4–7)
URATE SERPL-MCNC: 3.5 MG/DL (ref 3.4–7)
URATE SERPL-MCNC: 3.8 MG/DL (ref 3.4–7)
URATE SERPL-MCNC: 3.9 MG/DL (ref 3.4–7)
URATE SERPL-MCNC: 4.1 MG/DL (ref 3.4–7)
URATE SERPL-MCNC: 4.5 MG/DL (ref 3.4–7)
URATE SERPL-MCNC: 4.5 MG/DL (ref 3.4–7)
URATE SERPL-MCNC: 5.1 MG/DL (ref 3.4–7)
URATE SERPL-MCNC: 5.2 MG/DL (ref 3.4–7)
URATE SERPL-MCNC: 5.3 MG/DL (ref 3.4–7)
URATE SERPL-MCNC: 5.3 MG/DL (ref 3.4–7)
URATE SERPL-MCNC: 5.5 MG/DL (ref 3.4–7)
URATE SERPL-MCNC: 5.6 MG/DL (ref 3.4–7)
URATE SERPL-MCNC: 5.9 MG/DL (ref 3.4–7)
URATE SERPL-MCNC: 6.5 MG/DL (ref 3.4–7)
URATE SERPL-MCNC: 6.7 MG/DL (ref 3.4–7)
WBC # BLD AUTO: 11.84 K/UL (ref 3.9–12.7)
WBC # BLD AUTO: 14.6 K/UL (ref 3.9–12.7)
WBC # BLD AUTO: 14.82 K/UL (ref 3.9–12.7)
WBC # BLD AUTO: 19.66 K/UL (ref 3.9–12.7)
WBC # BLD AUTO: 21.11 K/UL (ref 3.9–12.7)
WBC # BLD AUTO: 24.55 K/UL (ref 3.9–12.7)
WBC # BLD AUTO: 25.47 K/UL (ref 3.9–12.7)
WBC # BLD AUTO: 32.99 K/UL (ref 3.9–12.7)
WBC # BLD AUTO: 37.41 K/UL (ref 3.9–12.7)
WBC # BLD AUTO: 50.3 K/UL (ref 3.9–12.7)
WBC # BLD AUTO: 62.98 K/UL (ref 3.9–12.7)
WBC # BLD AUTO: 8.81 K/UL (ref 3.9–12.7)
WBC # BLD AUTO: 86.99 K/UL (ref 3.9–12.7)
WBC OTHER NFR BLD MANUAL: 24 %
WBC OTHER NFR BLD MANUAL: 30 %
WBC OTHER NFR BLD MANUAL: 31 %
WBC OTHER NFR BLD MANUAL: 35 %
WBC OTHER NFR BLD MANUAL: 45 %
WBC OTHER NFR BLD MANUAL: 49 %
WBC OTHER NFR BLD MANUAL: 52 %
WBC OTHER NFR BLD MANUAL: 54 %
WBC OTHER NFR BLD MANUAL: 56 %
WBC OTHER NFR BLD MANUAL: 56 %
WBC OTHER NFR BLD MANUAL: 57 %
WBC OTHER NFR BLD MANUAL: 63 %
WBC OTHER NFR BLD MANUAL: 68 %
WBC TOXIC VACUOLES BLD QL SMEAR: PRESENT

## 2024-01-01 PROCEDURE — 84100 ASSAY OF PHOSPHORUS: CPT | Performed by: STUDENT IN AN ORGANIZED HEALTH CARE EDUCATION/TRAINING PROGRAM

## 2024-01-01 PROCEDURE — 80048 BASIC METABOLIC PNL TOTAL CA: CPT | Performed by: INTERNAL MEDICINE

## 2024-01-01 PROCEDURE — 85027 COMPLETE CBC AUTOMATED: CPT | Performed by: INTERNAL MEDICINE

## 2024-01-01 PROCEDURE — 93005 ELECTROCARDIOGRAM TRACING: CPT

## 2024-01-01 PROCEDURE — 84100 ASSAY OF PHOSPHORUS: CPT | Performed by: INTERNAL MEDICINE

## 2024-01-01 PROCEDURE — 88313 SPECIAL STAINS GROUP 2: CPT | Mod: 59 | Performed by: PATHOLOGY

## 2024-01-01 PROCEDURE — 84550 ASSAY OF BLOOD/URIC ACID: CPT | Performed by: INTERNAL MEDICINE

## 2024-01-01 PROCEDURE — 84550 ASSAY OF BLOOD/URIC ACID: CPT | Performed by: STUDENT IN AN ORGANIZED HEALTH CARE EDUCATION/TRAINING PROGRAM

## 2024-01-01 PROCEDURE — 63600175 PHARM REV CODE 636 W HCPCS: Performed by: STUDENT IN AN ORGANIZED HEALTH CARE EDUCATION/TRAINING PROGRAM

## 2024-01-01 PROCEDURE — A4216 STERILE WATER/SALINE, 10 ML: HCPCS | Performed by: INTERNAL MEDICINE

## 2024-01-01 PROCEDURE — 25000003 PHARM REV CODE 250: Performed by: NURSE PRACTITIONER

## 2024-01-01 PROCEDURE — 88184 FLOWCYTOMETRY/ TC 1 MARKER: CPT | Performed by: PATHOLOGY

## 2024-01-01 PROCEDURE — 63600175 PHARM REV CODE 636 W HCPCS: Performed by: INTERNAL MEDICINE

## 2024-01-01 PROCEDURE — 20600001 HC STEP DOWN PRIVATE ROOM

## 2024-01-01 PROCEDURE — 80321 ALCOHOLS BIOMARKERS 1OR 2: CPT | Performed by: STUDENT IN AN ORGANIZED HEALTH CARE EDUCATION/TRAINING PROGRAM

## 2024-01-01 PROCEDURE — 86692 HEPATITIS DELTA AGENT ANTBDY: CPT | Performed by: STUDENT IN AN ORGANIZED HEALTH CARE EDUCATION/TRAINING PROGRAM

## 2024-01-01 PROCEDURE — 07DR3ZX EXTRACTION OF ILIAC BONE MARROW, PERCUTANEOUS APPROACH, DIAGNOSTIC: ICD-10-PCS | Performed by: INTERNAL MEDICINE

## 2024-01-01 PROCEDURE — 87798 DETECT AGENT NOS DNA AMP: CPT | Mod: 59 | Performed by: STUDENT IN AN ORGANIZED HEALTH CARE EDUCATION/TRAINING PROGRAM

## 2024-01-01 PROCEDURE — 83735 ASSAY OF MAGNESIUM: CPT | Performed by: INTERNAL MEDICINE

## 2024-01-01 PROCEDURE — 80053 COMPREHEN METABOLIC PANEL: CPT | Mod: 91 | Performed by: INTERNAL MEDICINE

## 2024-01-01 PROCEDURE — 25000003 PHARM REV CODE 250: Performed by: STUDENT IN AN ORGANIZED HEALTH CARE EDUCATION/TRAINING PROGRAM

## 2024-01-01 PROCEDURE — 88305 TISSUE EXAM BY PATHOLOGIST: CPT | Mod: 26,,, | Performed by: PATHOLOGY

## 2024-01-01 PROCEDURE — 38222 DX BONE MARROW BX & ASPIR: CPT

## 2024-01-01 PROCEDURE — 84132 ASSAY OF SERUM POTASSIUM: CPT

## 2024-01-01 PROCEDURE — 25000003 PHARM REV CODE 250: Performed by: INTERNAL MEDICINE

## 2024-01-01 PROCEDURE — C1751 CATH, INF, PER/CENT/MIDLINE: HCPCS

## 2024-01-01 PROCEDURE — 99900035 HC TECH TIME PER 15 MIN (STAT)

## 2024-01-01 PROCEDURE — 99232 SBSQ HOSP IP/OBS MODERATE 35: CPT | Mod: ,,, | Performed by: INTERNAL MEDICINE

## 2024-01-01 PROCEDURE — 36415 COLL VENOUS BLD VENIPUNCTURE: CPT | Performed by: INTERNAL MEDICINE

## 2024-01-01 PROCEDURE — 87340 HEPATITIS B SURFACE AG IA: CPT | Performed by: NURSE PRACTITIONER

## 2024-01-01 PROCEDURE — 87517 HEPATITIS B DNA QUANT: CPT | Performed by: STUDENT IN AN ORGANIZED HEALTH CARE EDUCATION/TRAINING PROGRAM

## 2024-01-01 PROCEDURE — 88189 FLOWCYTOMETRY/READ 16 & >: CPT | Mod: ,,, | Performed by: PATHOLOGY

## 2024-01-01 PROCEDURE — 85384 FIBRINOGEN ACTIVITY: CPT | Performed by: INTERNAL MEDICINE

## 2024-01-01 PROCEDURE — 86850 RBC ANTIBODY SCREEN: CPT | Performed by: NURSE PRACTITIONER

## 2024-01-01 PROCEDURE — 85610 PROTHROMBIN TIME: CPT | Performed by: INTERNAL MEDICINE

## 2024-01-01 PROCEDURE — 94640 AIRWAY INHALATION TREATMENT: CPT

## 2024-01-01 PROCEDURE — 85384 FIBRINOGEN ACTIVITY: CPT | Performed by: STUDENT IN AN ORGANIZED HEALTH CARE EDUCATION/TRAINING PROGRAM

## 2024-01-01 PROCEDURE — 25500020 PHARM REV CODE 255: Performed by: INTERNAL MEDICINE

## 2024-01-01 PROCEDURE — 86901 BLOOD TYPING SEROLOGIC RH(D): CPT | Performed by: NURSE PRACTITIONER

## 2024-01-01 PROCEDURE — P9012 CRYOPRECIPITATE EACH UNIT: HCPCS | Performed by: STUDENT IN AN ORGANIZED HEALTH CARE EDUCATION/TRAINING PROGRAM

## 2024-01-01 PROCEDURE — 94799 UNLISTED PULMONARY SVC/PX: CPT | Mod: XB

## 2024-01-01 PROCEDURE — 80053 COMPREHEN METABOLIC PANEL: CPT | Performed by: INTERNAL MEDICINE

## 2024-01-01 PROCEDURE — 86850 RBC ANTIBODY SCREEN: CPT | Performed by: INTERNAL MEDICINE

## 2024-01-01 PROCEDURE — 83615 LACTATE (LD) (LDH) ENZYME: CPT | Performed by: NURSE PRACTITIONER

## 2024-01-01 PROCEDURE — 82565 ASSAY OF CREATININE: CPT

## 2024-01-01 PROCEDURE — 83605 ASSAY OF LACTIC ACID: CPT | Performed by: INTERNAL MEDICINE

## 2024-01-01 PROCEDURE — 80076 HEPATIC FUNCTION PANEL: CPT | Performed by: INTERNAL MEDICINE

## 2024-01-01 PROCEDURE — 82955 ASSAY OF G6PD ENZYME: CPT | Performed by: INTERNAL MEDICINE

## 2024-01-01 PROCEDURE — 88342 IMHCHEM/IMCYTCHM 1ST ANTB: CPT | Mod: 59 | Performed by: PATHOLOGY

## 2024-01-01 PROCEDURE — 88264 CHROMOSOME ANALYSIS 20-25: CPT | Performed by: NURSE PRACTITIONER

## 2024-01-01 PROCEDURE — 83615 LACTATE (LD) (LDH) ENZYME: CPT | Performed by: INTERNAL MEDICINE

## 2024-01-01 PROCEDURE — 63600175 PHARM REV CODE 636 W HCPCS: Performed by: NURSE PRACTITIONER

## 2024-01-01 PROCEDURE — 88341 IMHCHEM/IMCYTCHM EA ADD ANTB: CPT | Mod: 59 | Performed by: PATHOLOGY

## 2024-01-01 PROCEDURE — 84100 ASSAY OF PHOSPHORUS: CPT | Mod: 91 | Performed by: INTERNAL MEDICINE

## 2024-01-01 PROCEDURE — 85610 PROTHROMBIN TIME: CPT | Performed by: STUDENT IN AN ORGANIZED HEALTH CARE EDUCATION/TRAINING PROGRAM

## 2024-01-01 PROCEDURE — 88341 IMHCHEM/IMCYTCHM EA ADD ANTB: CPT | Mod: 26,59,, | Performed by: PATHOLOGY

## 2024-01-01 PROCEDURE — 82803 BLOOD GASES ANY COMBINATION: CPT

## 2024-01-01 PROCEDURE — 99238 HOSP IP/OBS DSCHRG MGMT 30/<: CPT | Mod: ,,, | Performed by: INTERNAL MEDICINE

## 2024-01-01 PROCEDURE — 99233 SBSQ HOSP IP/OBS HIGH 50: CPT | Mod: ,,, | Performed by: INTERNAL MEDICINE

## 2024-01-01 PROCEDURE — 36415 COLL VENOUS BLD VENIPUNCTURE: CPT | Mod: XB | Performed by: INTERNAL MEDICINE

## 2024-01-01 PROCEDURE — 85007 BL SMEAR W/DIFF WBC COUNT: CPT | Performed by: INTERNAL MEDICINE

## 2024-01-01 PROCEDURE — 80053 COMPREHEN METABOLIC PANEL: CPT | Performed by: STUDENT IN AN ORGANIZED HEALTH CARE EDUCATION/TRAINING PROGRAM

## 2024-01-01 PROCEDURE — 94761 N-INVAS EAR/PLS OXIMETRY MLT: CPT | Mod: XB

## 2024-01-01 PROCEDURE — 85060 BLOOD SMEAR INTERPRETATION: CPT | Mod: ,,, | Performed by: PATHOLOGY

## 2024-01-01 PROCEDURE — 85014 HEMATOCRIT: CPT

## 2024-01-01 PROCEDURE — 88305 TISSUE EXAM BY PATHOLOGIST: CPT | Mod: 59 | Performed by: PATHOLOGY

## 2024-01-01 PROCEDURE — 86709 HEPATITIS A IGM ANTIBODY: CPT | Performed by: STUDENT IN AN ORGANIZED HEALTH CARE EDUCATION/TRAINING PROGRAM

## 2024-01-01 PROCEDURE — 88313 SPECIAL STAINS GROUP 2: CPT | Mod: 26,,, | Performed by: PATHOLOGY

## 2024-01-01 PROCEDURE — 85730 THROMBOPLASTIN TIME PARTIAL: CPT | Performed by: STUDENT IN AN ORGANIZED HEALTH CARE EDUCATION/TRAINING PROGRAM

## 2024-01-01 PROCEDURE — 81310 NPM1 GENE: CPT | Performed by: STUDENT IN AN ORGANIZED HEALTH CARE EDUCATION/TRAINING PROGRAM

## 2024-01-01 PROCEDURE — 81245 FLT3 GENE: CPT | Performed by: STUDENT IN AN ORGANIZED HEALTH CARE EDUCATION/TRAINING PROGRAM

## 2024-01-01 PROCEDURE — 85730 THROMBOPLASTIN TIME PARTIAL: CPT | Performed by: INTERNAL MEDICINE

## 2024-01-01 PROCEDURE — 88311 DECALCIFY TISSUE: CPT | Performed by: PATHOLOGY

## 2024-01-01 PROCEDURE — 88185 FLOWCYTOMETRY/TC ADD-ON: CPT | Mod: 59 | Performed by: PATHOLOGY

## 2024-01-01 PROCEDURE — 85007 BL SMEAR W/DIFF WBC COUNT: CPT | Performed by: STUDENT IN AN ORGANIZED HEALTH CARE EDUCATION/TRAINING PROGRAM

## 2024-01-01 PROCEDURE — 99223 1ST HOSP IP/OBS HIGH 75: CPT | Mod: ,,, | Performed by: INTERNAL MEDICINE

## 2024-01-01 PROCEDURE — 80048 BASIC METABOLIC PNL TOTAL CA: CPT | Mod: XB | Performed by: INTERNAL MEDICINE

## 2024-01-01 PROCEDURE — 02HV33Z INSERTION OF INFUSION DEVICE INTO SUPERIOR VENA CAVA, PERCUTANEOUS APPROACH: ICD-10-PCS | Performed by: INTERNAL MEDICINE

## 2024-01-01 PROCEDURE — 86790 VIRUS ANTIBODY NOS: CPT | Performed by: STUDENT IN AN ORGANIZED HEALTH CARE EDUCATION/TRAINING PROGRAM

## 2024-01-01 PROCEDURE — 36573 INSJ PICC RS&I 5 YR+: CPT

## 2024-01-01 PROCEDURE — 36430 TRANSFUSION BLD/BLD COMPNT: CPT

## 2024-01-01 PROCEDURE — 84295 ASSAY OF SERUM SODIUM: CPT

## 2024-01-01 PROCEDURE — 88271 CYTOGENETICS DNA PROBE: CPT | Performed by: STUDENT IN AN ORGANIZED HEALTH CARE EDUCATION/TRAINING PROGRAM

## 2024-01-01 PROCEDURE — 86707 HEPATITIS BE ANTIBODY: CPT | Performed by: STUDENT IN AN ORGANIZED HEALTH CARE EDUCATION/TRAINING PROGRAM

## 2024-01-01 PROCEDURE — 86704 HEP B CORE ANTIBODY TOTAL: CPT | Performed by: NURSE PRACTITIONER

## 2024-01-01 PROCEDURE — 87341 HEP B SURFACE AG NEUTRLZJ IA: CPT | Performed by: NURSE PRACTITIONER

## 2024-01-01 PROCEDURE — 25000242 PHARM REV CODE 250 ALT 637 W/ HCPCS: Performed by: STUDENT IN AN ORGANIZED HEALTH CARE EDUCATION/TRAINING PROGRAM

## 2024-01-01 PROCEDURE — 85025 COMPLETE CBC W/AUTO DIFF WBC: CPT | Performed by: INTERNAL MEDICINE

## 2024-01-01 PROCEDURE — 81450 HL NEO GSAP 5-50DNA/DNA&RNA: CPT | Performed by: STUDENT IN AN ORGANIZED HEALTH CARE EDUCATION/TRAINING PROGRAM

## 2024-01-01 PROCEDURE — 88311 DECALCIFY TISSUE: CPT | Mod: 26,,, | Performed by: PATHOLOGY

## 2024-01-01 PROCEDURE — 84100 ASSAY OF PHOSPHORUS: CPT | Mod: 91 | Performed by: STUDENT IN AN ORGANIZED HEALTH CARE EDUCATION/TRAINING PROGRAM

## 2024-01-01 PROCEDURE — 80048 BASIC METABOLIC PNL TOTAL CA: CPT | Mod: 91 | Performed by: INTERNAL MEDICINE

## 2024-01-01 PROCEDURE — 82800 BLOOD PH: CPT

## 2024-01-01 PROCEDURE — 93010 ELECTROCARDIOGRAM REPORT: CPT | Mod: ,,, | Performed by: INTERNAL MEDICINE

## 2024-01-01 PROCEDURE — 82330 ASSAY OF CALCIUM: CPT

## 2024-01-01 PROCEDURE — 76937 US GUIDE VASCULAR ACCESS: CPT

## 2024-01-01 PROCEDURE — 87389 HIV-1 AG W/HIV-1&-2 AB AG IA: CPT | Performed by: NURSE PRACTITIONER

## 2024-01-01 PROCEDURE — 97165 OT EVAL LOW COMPLEX 30 MIN: CPT

## 2024-01-01 PROCEDURE — 80048 BASIC METABOLIC PNL TOTAL CA: CPT | Mod: 91 | Performed by: STUDENT IN AN ORGANIZED HEALTH CARE EDUCATION/TRAINING PROGRAM

## 2024-01-01 PROCEDURE — 86965 POOLING BLOOD PLATELETS: CPT | Performed by: STUDENT IN AN ORGANIZED HEALTH CARE EDUCATION/TRAINING PROGRAM

## 2024-01-01 PROCEDURE — 80053 COMPREHEN METABOLIC PANEL: CPT | Mod: 91 | Performed by: STUDENT IN AN ORGANIZED HEALTH CARE EDUCATION/TRAINING PROGRAM

## 2024-01-01 PROCEDURE — 88237 TISSUE CULTURE BONE MARROW: CPT | Performed by: NURSE PRACTITIONER

## 2024-01-01 PROCEDURE — 82248 BILIRUBIN DIRECT: CPT | Performed by: INTERNAL MEDICINE

## 2024-01-01 PROCEDURE — 30233M1 TRANSFUSION OF NONAUTOLOGOUS PLASMA CRYOPRECIPITATE INTO PERIPHERAL VEIN, PERCUTANEOUS APPROACH: ICD-10-PCS | Performed by: INTERNAL MEDICINE

## 2024-01-01 PROCEDURE — 84550 ASSAY OF BLOOD/URIC ACID: CPT | Mod: 91 | Performed by: INTERNAL MEDICINE

## 2024-01-01 PROCEDURE — 86803 HEPATITIS C AB TEST: CPT | Performed by: STUDENT IN AN ORGANIZED HEALTH CARE EDUCATION/TRAINING PROGRAM

## 2024-01-01 PROCEDURE — 82105 ALPHA-FETOPROTEIN SERUM: CPT | Performed by: STUDENT IN AN ORGANIZED HEALTH CARE EDUCATION/TRAINING PROGRAM

## 2024-01-01 PROCEDURE — 85097 BONE MARROW INTERPRETATION: CPT | Mod: ,,, | Performed by: PATHOLOGY

## 2024-01-01 PROCEDURE — 88342 IMHCHEM/IMCYTCHM 1ST ANTB: CPT | Mod: 26,59,, | Performed by: PATHOLOGY

## 2024-01-01 PROCEDURE — 38222 DX BONE MARROW BX & ASPIR: CPT | Mod: LT,,, | Performed by: NURSE PRACTITIONER

## 2024-01-01 PROCEDURE — 97161 PT EVAL LOW COMPLEX 20 MIN: CPT

## 2024-01-01 PROCEDURE — 80048 BASIC METABOLIC PNL TOTAL CA: CPT | Performed by: STUDENT IN AN ORGANIZED HEALTH CARE EDUCATION/TRAINING PROGRAM

## 2024-01-01 PROCEDURE — 85027 COMPLETE CBC AUTOMATED: CPT | Performed by: STUDENT IN AN ORGANIZED HEALTH CARE EDUCATION/TRAINING PROGRAM

## 2024-01-01 PROCEDURE — 83735 ASSAY OF MAGNESIUM: CPT | Mod: 91 | Performed by: STUDENT IN AN ORGANIZED HEALTH CARE EDUCATION/TRAINING PROGRAM

## 2024-01-01 PROCEDURE — 87350 HEPATITIS BE AG IA: CPT | Performed by: STUDENT IN AN ORGANIZED HEALTH CARE EDUCATION/TRAINING PROGRAM

## 2024-01-01 RX ORDER — OXYCODONE HYDROCHLORIDE 5 MG/1
5 TABLET ORAL EVERY 6 HOURS PRN
Qty: 28 TABLET | Refills: 0 | Status: SHIPPED | OUTPATIENT
Start: 2024-01-01 | End: 2024-01-01 | Stop reason: SDUPTHER

## 2024-01-01 RX ORDER — PROCHLORPERAZINE EDISYLATE 5 MG/ML
5 INJECTION INTRAMUSCULAR; INTRAVENOUS EVERY 6 HOURS PRN
Status: DISCONTINUED | OUTPATIENT
Start: 2024-01-01 | End: 2024-01-01

## 2024-01-01 RX ORDER — GUAIFENESIN AND DEXTROMETHORPHAN HYDROBROMIDE 10; 100 MG/5ML; MG/5ML
5 SYRUP ORAL EVERY 4 HOURS PRN
Qty: 236 ML | Refills: 0 | Status: SHIPPED | OUTPATIENT
Start: 2024-01-01 | End: 2024-01-01

## 2024-01-01 RX ORDER — SULFAMETHOXAZOLE AND TRIMETHOPRIM 800; 160 MG/1; MG/1
1 TABLET ORAL
Status: DISCONTINUED | OUTPATIENT
Start: 2024-01-01 | End: 2024-01-01

## 2024-01-01 RX ORDER — HYDROMORPHONE HYDROCHLORIDE 1 MG/ML
0.5 INJECTION, SOLUTION INTRAMUSCULAR; INTRAVENOUS; SUBCUTANEOUS ONCE AS NEEDED
Status: COMPLETED | OUTPATIENT
Start: 2024-01-01 | End: 2024-01-01

## 2024-01-01 RX ORDER — CALCIUM CARBONATE 200(500)MG
1000 TABLET,CHEWABLE ORAL 2 TIMES DAILY
Status: DISCONTINUED | OUTPATIENT
Start: 2024-01-01 | End: 2024-01-01 | Stop reason: HOSPADM

## 2024-01-01 RX ORDER — SODIUM,POTASSIUM PHOSPHATES 280-250MG
2 POWDER IN PACKET (EA) ORAL ONCE
Status: COMPLETED | OUTPATIENT
Start: 2024-01-01 | End: 2024-01-01

## 2024-01-01 RX ORDER — SODIUM CHLORIDE 0.9 % (FLUSH) 0.9 %
10 SYRINGE (ML) INJECTION
Status: DISCONTINUED | OUTPATIENT
Start: 2024-01-01 | End: 2024-01-01 | Stop reason: HOSPADM

## 2024-01-01 RX ORDER — HYDRALAZINE HYDROCHLORIDE 25 MG/1
25 TABLET, FILM COATED ORAL EVERY 8 HOURS
Status: DISCONTINUED | OUTPATIENT
Start: 2024-01-01 | End: 2024-01-01

## 2024-01-01 RX ORDER — CALCIUM GLUCONATE 20 MG/ML
1 INJECTION, SOLUTION INTRAVENOUS EVERY 10 MIN PRN
Status: DISCONTINUED | OUTPATIENT
Start: 2024-01-01 | End: 2024-01-01 | Stop reason: HOSPADM

## 2024-01-01 RX ORDER — CARVEDILOL 6.25 MG/1
6.25 TABLET ORAL 2 TIMES DAILY
Status: DISCONTINUED | OUTPATIENT
Start: 2024-01-01 | End: 2024-01-01

## 2024-01-01 RX ORDER — LEVOFLOXACIN 500 MG/1
500 TABLET, FILM COATED ORAL DAILY
Qty: 30 TABLET | Refills: 5 | Status: SHIPPED | OUTPATIENT
Start: 2024-01-01 | End: 2024-01-01 | Stop reason: HOSPADM

## 2024-01-01 RX ORDER — SODIUM CHLORIDE 0.9 % (FLUSH) 0.9 %
10 SYRINGE (ML) INJECTION EVERY 12 HOURS PRN
Status: DISCONTINUED | OUTPATIENT
Start: 2024-01-01 | End: 2024-01-01 | Stop reason: HOSPADM

## 2024-01-01 RX ORDER — CALCIUM CARBONATE 200(500)MG
1000 TABLET,CHEWABLE ORAL 2 TIMES DAILY
Qty: 120 TABLET | Refills: 11 | Status: SHIPPED | OUTPATIENT
Start: 2024-01-01 | End: 2025-03-28

## 2024-01-01 RX ORDER — ALLOPURINOL 300 MG/1
300 TABLET ORAL DAILY
Qty: 30 TABLET | Refills: 3 | Status: SHIPPED | OUTPATIENT
Start: 2024-01-01

## 2024-01-01 RX ORDER — ONDANSETRON 2 MG/ML
8 INJECTION INTRAMUSCULAR; INTRAVENOUS
Status: DISCONTINUED | OUTPATIENT
Start: 2024-01-01 | End: 2024-01-01 | Stop reason: ALTCHOICE

## 2024-01-01 RX ORDER — IBUPROFEN 200 MG
24 TABLET ORAL
Status: DISCONTINUED | OUTPATIENT
Start: 2024-01-01 | End: 2024-01-01 | Stop reason: HOSPADM

## 2024-01-01 RX ORDER — MUPIROCIN 20 MG/G
OINTMENT TOPICAL 2 TIMES DAILY
Status: DISPENSED | OUTPATIENT
Start: 2024-01-01 | End: 2024-01-01

## 2024-01-01 RX ORDER — BENZONATATE 100 MG/1
100 CAPSULE ORAL 3 TIMES DAILY PRN
Status: DISCONTINUED | OUTPATIENT
Start: 2024-01-01 | End: 2024-01-01 | Stop reason: HOSPADM

## 2024-01-01 RX ORDER — PROCHLORPERAZINE EDISYLATE 5 MG/ML
10 INJECTION INTRAMUSCULAR; INTRAVENOUS EVERY 6 HOURS PRN
Status: DISCONTINUED | OUTPATIENT
Start: 2024-01-01 | End: 2024-01-01 | Stop reason: SDUPTHER

## 2024-01-01 RX ORDER — MAGNESIUM SULFATE HEPTAHYDRATE 40 MG/ML
INJECTION, SOLUTION INTRAVENOUS
Status: DISCONTINUED
Start: 2024-01-01 | End: 2024-01-01 | Stop reason: WASHOUT

## 2024-01-01 RX ORDER — LIDOCAINE HYDROCHLORIDE 20 MG/ML
10 INJECTION, SOLUTION INFILTRATION; PERINEURAL ONCE AS NEEDED
Status: COMPLETED | OUTPATIENT
Start: 2024-01-01 | End: 2024-01-01

## 2024-01-01 RX ORDER — GUAIFENESIN AND DEXTROMETHORPHAN HYDROBROMIDE 10; 100 MG/5ML; MG/5ML
5 SYRUP ORAL EVERY 4 HOURS PRN
Status: DISCONTINUED | OUTPATIENT
Start: 2024-01-01 | End: 2024-01-01 | Stop reason: HOSPADM

## 2024-01-01 RX ORDER — ACYCLOVIR 200 MG/1
400 CAPSULE ORAL 2 TIMES DAILY
Status: DISCONTINUED | OUTPATIENT
Start: 2024-01-01 | End: 2024-01-01 | Stop reason: HOSPADM

## 2024-01-01 RX ORDER — SODIUM CHLORIDE 9 MG/ML
INJECTION, SOLUTION INTRAVENOUS CONTINUOUS
Status: DISCONTINUED | OUTPATIENT
Start: 2024-01-01 | End: 2024-01-01

## 2024-01-01 RX ORDER — MORPHINE SULFATE 2 MG/ML
2 INJECTION, SOLUTION INTRAMUSCULAR; INTRAVENOUS EVERY 4 HOURS PRN
Status: DISCONTINUED | OUTPATIENT
Start: 2024-01-01 | End: 2024-01-01 | Stop reason: HOSPADM

## 2024-01-01 RX ORDER — SODIUM CHLORIDE 9 MG/ML
INJECTION, SOLUTION INTRAVENOUS CONTINUOUS
Status: ACTIVE | OUTPATIENT
Start: 2024-01-01 | End: 2024-01-01

## 2024-01-01 RX ORDER — LEVOFLOXACIN 500 MG/1
500 TABLET, FILM COATED ORAL DAILY
Status: DISCONTINUED | OUTPATIENT
Start: 2024-01-01 | End: 2024-01-01

## 2024-01-01 RX ORDER — MAGNESIUM SULFATE HEPTAHYDRATE 40 MG/ML
2 INJECTION, SOLUTION INTRAVENOUS ONCE
Status: COMPLETED | OUTPATIENT
Start: 2024-01-01 | End: 2024-01-01

## 2024-01-01 RX ORDER — OLANZAPINE 2.5 MG/1
5 TABLET ORAL NIGHTLY
Status: DISCONTINUED | OUTPATIENT
Start: 2024-01-01 | End: 2024-01-01 | Stop reason: HOSPADM

## 2024-01-01 RX ORDER — ATOVAQUONE 750 MG/5ML
1500 SUSPENSION ORAL DAILY
Qty: 210 ML | Refills: 6 | Status: SHIPPED | OUTPATIENT
Start: 2024-01-01

## 2024-01-01 RX ORDER — HYDRALAZINE HYDROCHLORIDE 20 MG/ML
5 INJECTION INTRAMUSCULAR; INTRAVENOUS EVERY 6 HOURS PRN
Status: DISCONTINUED | OUTPATIENT
Start: 2024-01-01 | End: 2024-01-01

## 2024-01-01 RX ORDER — PROCHLORPERAZINE MALEATE 5 MG
10 TABLET ORAL 4 TIMES DAILY PRN
Qty: 60 TABLET | Refills: 0 | Status: SHIPPED | OUTPATIENT
Start: 2024-01-01 | End: 2024-01-01

## 2024-01-01 RX ORDER — DIPHENHYDRAMINE HYDROCHLORIDE 50 MG/ML
50 INJECTION INTRAMUSCULAR; INTRAVENOUS ONCE AS NEEDED
Status: DISCONTINUED | OUTPATIENT
Start: 2024-01-01 | End: 2024-01-01 | Stop reason: HOSPADM

## 2024-01-01 RX ORDER — HYDROCODONE BITARTRATE AND ACETAMINOPHEN 500; 5 MG/1; MG/1
TABLET ORAL
Status: DISCONTINUED | OUTPATIENT
Start: 2024-01-01 | End: 2024-01-01 | Stop reason: HOSPADM

## 2024-01-01 RX ORDER — ALLOPURINOL 300 MG/1
300 TABLET ORAL DAILY
Status: DISCONTINUED | OUTPATIENT
Start: 2024-01-01 | End: 2024-01-01 | Stop reason: HOSPADM

## 2024-01-01 RX ORDER — ACYCLOVIR 200 MG/1
400 CAPSULE ORAL 2 TIMES DAILY
Qty: 120 CAPSULE | Refills: 11 | Status: SHIPPED | OUTPATIENT
Start: 2024-01-01 | End: 2025-03-28

## 2024-01-01 RX ORDER — CALCIUM GLUCONATE 20 MG/ML
1 INJECTION, SOLUTION INTRAVENOUS ONCE
Status: COMPLETED | OUTPATIENT
Start: 2024-01-01 | End: 2024-01-01

## 2024-01-01 RX ORDER — CARVEDILOL 12.5 MG/1
12.5 TABLET ORAL 2 TIMES DAILY
Qty: 60 TABLET | Refills: 11 | Status: SHIPPED | OUTPATIENT
Start: 2024-01-01 | End: 2025-03-28

## 2024-01-01 RX ORDER — GUAIFENESIN 600 MG/1
600 TABLET, EXTENDED RELEASE ORAL 2 TIMES DAILY
Status: DISCONTINUED | OUTPATIENT
Start: 2024-01-01 | End: 2024-01-01

## 2024-01-01 RX ORDER — SODIUM CHLORIDE 0.9 % (FLUSH) 0.9 %
10 SYRINGE (ML) INJECTION EVERY 6 HOURS
Status: DISCONTINUED | OUTPATIENT
Start: 2024-01-01 | End: 2024-01-01 | Stop reason: HOSPADM

## 2024-01-01 RX ORDER — OXYCODONE HYDROCHLORIDE 10 MG/1
10 TABLET ORAL EVERY 4 HOURS PRN
Status: DISCONTINUED | OUTPATIENT
Start: 2024-01-01 | End: 2024-01-01 | Stop reason: HOSPADM

## 2024-01-01 RX ORDER — CARVEDILOL 12.5 MG/1
12.5 TABLET ORAL 2 TIMES DAILY
Status: DISCONTINUED | OUTPATIENT
Start: 2024-01-01 | End: 2024-01-01 | Stop reason: HOSPADM

## 2024-01-01 RX ORDER — ATOVAQUONE 750 MG/5ML
1500 SUSPENSION ORAL DAILY
Status: DISCONTINUED | OUTPATIENT
Start: 2024-01-01 | End: 2024-01-01 | Stop reason: HOSPADM

## 2024-01-01 RX ORDER — ONDANSETRON HYDROCHLORIDE 2 MG/ML
4 INJECTION, SOLUTION INTRAVENOUS EVERY 8 HOURS PRN
Status: DISCONTINUED | OUTPATIENT
Start: 2024-01-01 | End: 2024-01-01

## 2024-01-01 RX ORDER — ALBUTEROL SULFATE 2.5 MG/.5ML
10 SOLUTION RESPIRATORY (INHALATION) ONCE
Status: COMPLETED | OUTPATIENT
Start: 2024-01-01 | End: 2024-01-01

## 2024-01-01 RX ORDER — ALLOPURINOL 300 MG/1
300 TABLET ORAL 2 TIMES DAILY
Status: DISCONTINUED | OUTPATIENT
Start: 2024-01-01 | End: 2024-01-01

## 2024-01-01 RX ORDER — AMOXICILLIN 250 MG
1 CAPSULE ORAL DAILY PRN
Status: DISCONTINUED | OUTPATIENT
Start: 2024-01-01 | End: 2024-01-01 | Stop reason: HOSPADM

## 2024-01-01 RX ORDER — ALLOPURINOL 300 MG/1
300 TABLET ORAL DAILY
Status: DISCONTINUED | OUTPATIENT
Start: 2024-01-01 | End: 2024-01-01

## 2024-01-01 RX ORDER — GLUCAGON 1 MG
1 KIT INJECTION
Status: DISCONTINUED | OUTPATIENT
Start: 2024-01-01 | End: 2024-01-01 | Stop reason: HOSPADM

## 2024-01-01 RX ORDER — CALCIUM GLUCONATE 20 MG/ML
1 INJECTION, SOLUTION INTRAVENOUS
Status: DISCONTINUED | OUTPATIENT
Start: 2024-01-01 | End: 2024-01-01

## 2024-01-01 RX ORDER — OXYCODONE HYDROCHLORIDE 5 MG/1
5 TABLET ORAL EVERY 6 HOURS PRN
Qty: 28 TABLET | Refills: 0 | Status: SHIPPED | OUTPATIENT
Start: 2024-01-01 | End: 2024-01-01 | Stop reason: HOSPADM

## 2024-01-01 RX ORDER — FLUCONAZOLE 200 MG/1
200 TABLET ORAL DAILY
Status: DISCONTINUED | OUTPATIENT
Start: 2024-01-01 | End: 2024-01-01

## 2024-01-01 RX ORDER — ACYCLOVIR 200 MG/1
400 CAPSULE ORAL 2 TIMES DAILY
Status: DISCONTINUED | OUTPATIENT
Start: 2024-01-01 | End: 2024-01-01

## 2024-01-01 RX ORDER — NALOXONE HCL 0.4 MG/ML
0.02 VIAL (ML) INJECTION
Status: DISCONTINUED | OUTPATIENT
Start: 2024-01-01 | End: 2024-01-01 | Stop reason: HOSPADM

## 2024-01-01 RX ORDER — FUROSEMIDE 10 MG/ML
40 INJECTION INTRAMUSCULAR; INTRAVENOUS ONCE
Status: COMPLETED | OUTPATIENT
Start: 2024-01-01 | End: 2024-01-01

## 2024-01-01 RX ORDER — EPINEPHRINE 0.3 MG/.3ML
0.3 INJECTION SUBCUTANEOUS ONCE AS NEEDED
Status: DISCONTINUED | OUTPATIENT
Start: 2024-01-01 | End: 2024-01-01 | Stop reason: HOSPADM

## 2024-01-01 RX ORDER — FLUCONAZOLE 200 MG/1
400 TABLET ORAL DAILY
Status: DISCONTINUED | OUTPATIENT
Start: 2024-01-01 | End: 2024-01-01

## 2024-01-01 RX ORDER — ONDANSETRON HYDROCHLORIDE 2 MG/ML
8 INJECTION, SOLUTION INTRAVENOUS
Status: COMPLETED | OUTPATIENT
Start: 2024-01-01 | End: 2024-01-01

## 2024-01-01 RX ORDER — PROCHLORPERAZINE EDISYLATE 5 MG/ML
10 INJECTION INTRAMUSCULAR; INTRAVENOUS EVERY 6 HOURS PRN
Status: DISCONTINUED | OUTPATIENT
Start: 2024-01-01 | End: 2024-01-01 | Stop reason: HOSPADM

## 2024-01-01 RX ORDER — OXYCODONE HYDROCHLORIDE 5 MG/1
5 TABLET ORAL ONCE
Status: COMPLETED | OUTPATIENT
Start: 2024-01-01 | End: 2024-01-01

## 2024-01-01 RX ORDER — BENZONATATE 100 MG/1
100 CAPSULE ORAL 3 TIMES DAILY PRN
Qty: 30 CAPSULE | Refills: 0 | Status: SHIPPED | OUTPATIENT
Start: 2024-01-01 | End: 2024-01-01

## 2024-01-01 RX ORDER — MAGNESIUM SULFATE HEPTAHYDRATE 40 MG/ML
4 INJECTION, SOLUTION INTRAVENOUS ONCE
Status: COMPLETED | OUTPATIENT
Start: 2024-01-01 | End: 2024-01-01

## 2024-01-01 RX ORDER — AMOXICILLIN 250 MG
1 CAPSULE ORAL DAILY PRN
Start: 2024-01-01

## 2024-01-01 RX ORDER — SODIUM CHLORIDE 9 MG/ML
INJECTION, SOLUTION INTRAVENOUS CONTINUOUS
Status: DISCONTINUED | OUTPATIENT
Start: 2024-01-01 | End: 2024-01-01 | Stop reason: HOSPADM

## 2024-01-01 RX ORDER — IBUPROFEN 200 MG
16 TABLET ORAL
Status: DISCONTINUED | OUTPATIENT
Start: 2024-01-01 | End: 2024-01-01 | Stop reason: HOSPADM

## 2024-01-01 RX ORDER — DIAZEPAM 10 MG/2ML
5 INJECTION INTRAMUSCULAR ONCE AS NEEDED
Status: DISCONTINUED | OUTPATIENT
Start: 2024-01-01 | End: 2024-01-01

## 2024-01-01 RX ORDER — LEVOFLOXACIN 500 MG/1
500 TABLET, FILM COATED ORAL DAILY
Status: DISCONTINUED | OUTPATIENT
Start: 2024-01-01 | End: 2024-01-01 | Stop reason: HOSPADM

## 2024-01-01 RX ORDER — HYDRALAZINE HYDROCHLORIDE 25 MG/1
25 TABLET, FILM COATED ORAL EVERY 8 HOURS PRN
Status: DISCONTINUED | OUTPATIENT
Start: 2024-01-01 | End: 2024-01-01

## 2024-01-01 RX ORDER — FLUCONAZOLE 2 MG/ML
400 INJECTION, SOLUTION INTRAVENOUS
Status: DISCONTINUED | OUTPATIENT
Start: 2024-01-01 | End: 2024-01-01

## 2024-01-01 RX ORDER — MAGNESIUM SULFATE HEPTAHYDRATE 40 MG/ML
2 INJECTION, SOLUTION INTRAVENOUS
Status: COMPLETED | OUTPATIENT
Start: 2024-01-01 | End: 2024-01-01

## 2024-01-01 RX ORDER — OXYCODONE HYDROCHLORIDE 5 MG/1
5 TABLET ORAL EVERY 4 HOURS PRN
Status: DISCONTINUED | OUTPATIENT
Start: 2024-01-01 | End: 2024-01-01 | Stop reason: HOSPADM

## 2024-01-01 RX ORDER — CALCITONIN SALMON 200 [USP'U]/ML
4 INJECTION, SOLUTION INTRAMUSCULAR; SUBCUTANEOUS 2 TIMES DAILY
Status: COMPLETED | OUTPATIENT
Start: 2024-01-01 | End: 2024-01-01

## 2024-01-01 RX ORDER — HEPARIN 100 UNIT/ML
500 SYRINGE INTRAVENOUS
Status: DISCONTINUED | OUTPATIENT
Start: 2024-01-01 | End: 2024-01-01 | Stop reason: HOSPADM

## 2024-01-01 RX ORDER — LORAZEPAM 2 MG/ML
0.5 INJECTION INTRAMUSCULAR ONCE AS NEEDED
Status: DISCONTINUED | OUTPATIENT
Start: 2024-01-01 | End: 2024-01-01

## 2024-01-01 RX ORDER — LEVOFLOXACIN 5 MG/ML
500 INJECTION, SOLUTION INTRAVENOUS
Status: DISCONTINUED | OUTPATIENT
Start: 2024-01-01 | End: 2024-01-01

## 2024-01-01 RX ORDER — CALCIUM CARBONATE 200(500)MG
1000 TABLET,CHEWABLE ORAL ONCE
Status: COMPLETED | OUTPATIENT
Start: 2024-01-01 | End: 2024-01-01

## 2024-01-01 RX ADMIN — GUAIFENESIN AND DEXTROMETHORPHAN HYDROBROMIDE 1 TABLET: 600; 30 TABLET, EXTENDED RELEASE ORAL at 08:03

## 2024-01-01 RX ADMIN — Medication 10 ML: at 06:03

## 2024-01-01 RX ADMIN — PREDNISONE 109 MG: 5 TABLET ORAL at 08:03

## 2024-01-01 RX ADMIN — LEVOFLOXACIN 500 MG: 500 TABLET, FILM COATED ORAL at 09:03

## 2024-01-01 RX ADMIN — FLUCONAZOLE 400 MG: 200 TABLET ORAL at 09:03

## 2024-01-01 RX ADMIN — ONDANSETRON 4 MG: 2 INJECTION INTRAMUSCULAR; INTRAVENOUS at 09:03

## 2024-01-01 RX ADMIN — Medication 10 ML: at 11:03

## 2024-01-01 RX ADMIN — OLANZAPINE 5 MG: 2.5 TABLET, FILM COATED ORAL at 08:03

## 2024-01-01 RX ADMIN — OXYCODONE 5 MG: 5 TABLET ORAL at 08:03

## 2024-01-01 RX ADMIN — OXYCODONE 5 MG: 5 TABLET ORAL at 06:03

## 2024-01-01 RX ADMIN — FLUCONAZOLE 400 MG: 2 INJECTION, SOLUTION INTRAVENOUS at 05:03

## 2024-01-01 RX ADMIN — ACYCLOVIR SODIUM 200 MG: 50 INJECTION, SOLUTION INTRAVENOUS at 10:03

## 2024-01-01 RX ADMIN — Medication 10 ML: at 12:03

## 2024-01-01 RX ADMIN — IOHEXOL 75 ML: 350 INJECTION, SOLUTION INTRAVENOUS at 11:03

## 2024-01-01 RX ADMIN — ACYCLOVIR SODIUM 200 MG: 50 INJECTION, SOLUTION INTRAVENOUS at 11:03

## 2024-01-01 RX ADMIN — POTASSIUM & SODIUM PHOSPHATES POWDER PACK 280-160-250 MG 2 PACKET: 280-160-250 PACK at 08:03

## 2024-01-01 RX ADMIN — ATOVAQUONE 1500 MG: 750 SUSPENSION ORAL at 08:03

## 2024-01-01 RX ADMIN — OXYCODONE 5 MG: 5 TABLET ORAL at 10:03

## 2024-01-01 RX ADMIN — SODIUM CHLORIDE: 9 INJECTION, SOLUTION INTRAVENOUS at 11:03

## 2024-01-01 RX ADMIN — SODIUM CHLORIDE: 9 INJECTION, SOLUTION INTRAVENOUS at 07:03

## 2024-01-01 RX ADMIN — OXYCODONE 5 MG: 5 TABLET ORAL at 07:03

## 2024-01-01 RX ADMIN — ACYCLOVIR 400 MG: 200 CAPSULE ORAL at 08:03

## 2024-01-01 RX ADMIN — CALCITONIN SALMON 266 UNITS: 200 INJECTION, SOLUTION INTRAMUSCULAR; SUBCUTANEOUS at 09:03

## 2024-01-01 RX ADMIN — DEXTROSE MONOHYDRATE 500 ML: 100 INJECTION, SOLUTION INTRAVENOUS at 11:03

## 2024-01-01 RX ADMIN — GUAIFENESIN AND DEXTROMETHORPHAN HYDROBROMIDE 1 TABLET: 600; 30 TABLET, EXTENDED RELEASE ORAL at 04:03

## 2024-01-01 RX ADMIN — ONDANSETRON 8 MG: 2 INJECTION INTRAMUSCULAR; INTRAVENOUS at 04:03

## 2024-01-01 RX ADMIN — PREDNISONE 109 MG: 5 TABLET ORAL at 09:03

## 2024-01-01 RX ADMIN — CYCLOPHOSPHAMIDE 1360 MG: 200 INJECTION, SOLUTION INTRAVENOUS at 04:03

## 2024-01-01 RX ADMIN — CALCIUM GLUCONATE 1 G: 20 INJECTION, SOLUTION INTRAVENOUS at 11:03

## 2024-01-01 RX ADMIN — ONDANSETRON 8 MG: 2 INJECTION INTRAMUSCULAR; INTRAVENOUS at 05:03

## 2024-01-01 RX ADMIN — LEVOFLOXACIN 500 MG: 500 INJECTION, SOLUTION INTRAVENOUS at 03:03

## 2024-01-01 RX ADMIN — ACYCLOVIR 400 MG: 200 CAPSULE ORAL at 09:03

## 2024-01-01 RX ADMIN — SODIUM ZIRCONIUM CYCLOSILICATE 10 G: 5 POWDER, FOR SUSPENSION ORAL at 09:03

## 2024-01-01 RX ADMIN — ONDANSETRON 8 MG: 2 INJECTION INTRAMUSCULAR; INTRAVENOUS at 03:03

## 2024-01-01 RX ADMIN — MUPIROCIN: 20 OINTMENT TOPICAL at 08:03

## 2024-01-01 RX ADMIN — CARVEDILOL 12.5 MG: 12.5 TABLET, FILM COATED ORAL at 09:03

## 2024-01-01 RX ADMIN — BENZONATATE 100 MG: 100 CAPSULE ORAL at 05:03

## 2024-01-01 RX ADMIN — PREDNISONE 109 MG: 5 TABLET ORAL at 10:03

## 2024-01-01 RX ADMIN — SODIUM CHLORIDE: 9 INJECTION, SOLUTION INTRAVENOUS at 04:03

## 2024-01-01 RX ADMIN — OXYCODONE 5 MG: 5 TABLET ORAL at 04:03

## 2024-01-01 RX ADMIN — TENOFOVIR ALAFENAMIDE 25 MG: 25 TABLET ORAL at 08:03

## 2024-01-01 RX ADMIN — ALBUTEROL SULFATE 10 MG: 2.5 SOLUTION RESPIRATORY (INHALATION) at 09:03

## 2024-01-01 RX ADMIN — GUAIFENESIN 600 MG: 600 TABLET, EXTENDED RELEASE ORAL at 08:03

## 2024-01-01 RX ADMIN — MUPIROCIN: 20 OINTMENT TOPICAL at 09:03

## 2024-01-01 RX ADMIN — FUROSEMIDE 40 MG: 10 INJECTION, SOLUTION INTRAMUSCULAR; INTRAVENOUS at 02:03

## 2024-01-01 RX ADMIN — ATOVAQUONE 1500 MG: 750 SUSPENSION ORAL at 05:03

## 2024-01-01 RX ADMIN — GUAIFENESIN AND DEXTROMETHORPHAN 5 ML: 100; 10 SYRUP ORAL at 05:03

## 2024-01-01 RX ADMIN — POTASSIUM & SODIUM PHOSPHATES POWDER PACK 280-160-250 MG 2 PACKET: 280-160-250 PACK at 09:03

## 2024-01-01 RX ADMIN — CARVEDILOL 12.5 MG: 12.5 TABLET, FILM COATED ORAL at 08:03

## 2024-01-01 RX ADMIN — TENOFOVIR ALAFENAMIDE 25 MG: 25 TABLET ORAL at 11:03

## 2024-01-01 RX ADMIN — SODIUM ZIRCONIUM CYCLOSILICATE 10 G: 5 POWDER, FOR SUSPENSION ORAL at 11:03

## 2024-01-01 RX ADMIN — SODIUM CHLORIDE: 9 INJECTION, SOLUTION INTRAVENOUS at 03:03

## 2024-01-01 RX ADMIN — SODIUM CHLORIDE: 9 INJECTION, SOLUTION INTRAVENOUS at 02:03

## 2024-01-01 RX ADMIN — CARVEDILOL 6.25 MG: 6.25 TABLET, FILM COATED ORAL at 08:03

## 2024-01-01 RX ADMIN — OXYCODONE 5 MG: 5 TABLET ORAL at 05:03

## 2024-01-01 RX ADMIN — OLANZAPINE 5 MG: 2.5 TABLET, FILM COATED ORAL at 09:03

## 2024-01-01 RX ADMIN — ALLOPURINOL 300 MG: 300 TABLET ORAL at 09:03

## 2024-01-01 RX ADMIN — ACYCLOVIR SODIUM 200 MG: 50 INJECTION, SOLUTION INTRAVENOUS at 12:03

## 2024-01-01 RX ADMIN — INSULIN HUMAN 10 UNITS: 100 INJECTION, SOLUTION PARENTERAL at 11:03

## 2024-01-01 RX ADMIN — ETOPOSIDE 10 MG: 20 INJECTION, SOLUTION INTRAVENOUS at 07:03

## 2024-01-01 RX ADMIN — GUAIFENESIN AND DEXTROMETHORPHAN HYDROBROMIDE 1 TABLET: 600; 30 TABLET, EXTENDED RELEASE ORAL at 09:03

## 2024-01-01 RX ADMIN — ALLOPURINOL 300 MG: 300 TABLET ORAL at 08:03

## 2024-01-01 RX ADMIN — SODIUM CHLORIDE: 9 INJECTION, SOLUTION INTRAVENOUS at 05:03

## 2024-01-01 RX ADMIN — OXYCODONE 5 MG: 5 TABLET ORAL at 09:03

## 2024-01-01 RX ADMIN — POTASSIUM PHOSPHATE, MONOBASIC AND POTASSIUM PHOSPHATE, DIBASIC 30 MMOL: 224; 236 INJECTION, SOLUTION, CONCENTRATE INTRAVENOUS at 09:03

## 2024-01-01 RX ADMIN — SODIUM CHLORIDE: 9 INJECTION, SOLUTION INTRAVENOUS at 08:03

## 2024-01-01 RX ADMIN — MAGNESIUM SULFATE HEPTAHYDRATE 2 G: 40 INJECTION, SOLUTION INTRAVENOUS at 11:03

## 2024-01-01 RX ADMIN — ATOVAQUONE 1500 MG: 750 SUSPENSION ORAL at 09:03

## 2024-01-01 RX ADMIN — TENOFOVIR ALAFENAMIDE 25 MG: 25 TABLET ORAL at 09:03

## 2024-01-01 RX ADMIN — OXYCODONE 5 MG: 5 TABLET ORAL at 12:03

## 2024-01-01 RX ADMIN — BENZONATATE 100 MG: 100 CAPSULE ORAL at 04:03

## 2024-01-01 RX ADMIN — LEVOFLOXACIN 500 MG: 500 TABLET, FILM COATED ORAL at 08:03

## 2024-01-01 RX ADMIN — MAGNESIUM SULFATE HEPTAHYDRATE 2 G: 40 INJECTION, SOLUTION INTRAVENOUS at 09:03

## 2024-01-01 RX ADMIN — Medication 10 ML: at 05:03

## 2024-01-01 RX ADMIN — GUAIFENESIN AND DEXTROMETHORPHAN 5 ML: 100; 10 SYRUP ORAL at 03:03

## 2024-01-01 RX ADMIN — OLANZAPINE 5 MG: 2.5 TABLET, FILM COATED ORAL at 04:03

## 2024-01-01 RX ADMIN — ALBUTEROL SULFATE 10 MG: 2.5 SOLUTION RESPIRATORY (INHALATION) at 10:03

## 2024-01-01 RX ADMIN — GUAIFENESIN 600 MG: 600 TABLET, EXTENDED RELEASE ORAL at 09:03

## 2024-01-01 RX ADMIN — PREDNISONE 109 MG: 5 TABLET ORAL at 04:03

## 2024-01-01 RX ADMIN — PREDNISONE 109 MG: 5 TABLET ORAL at 11:03

## 2024-01-01 RX ADMIN — BENZONATATE 100 MG: 100 CAPSULE ORAL at 08:03

## 2024-01-01 RX ADMIN — SODIUM ZIRCONIUM CYCLOSILICATE 10 G: 5 POWDER, FOR SUSPENSION ORAL at 02:03

## 2024-01-01 RX ADMIN — POTASSIUM PHOSPHATE, MONOBASIC AND POTASSIUM PHOSPHATE, DIBASIC 30 MMOL: 224; 236 INJECTION, SOLUTION, CONCENTRATE INTRAVENOUS at 07:03

## 2024-01-01 RX ADMIN — MAGNESIUM SULFATE HEPTAHYDRATE 2 G: 40 INJECTION, SOLUTION INTRAVENOUS at 08:03

## 2024-01-01 RX ADMIN — SODIUM CHLORIDE: 9 INJECTION, SOLUTION INTRAVENOUS at 09:03

## 2024-01-01 RX ADMIN — CARVEDILOL 6.25 MG: 6.25 TABLET, FILM COATED ORAL at 09:03

## 2024-01-01 RX ADMIN — MORPHINE SULFATE 2 MG: 2 INJECTION, SOLUTION INTRAMUSCULAR; INTRAVENOUS at 10:03

## 2024-01-01 RX ADMIN — ETOPOSIDE 10 MG: 20 INJECTION, SOLUTION INTRAVENOUS at 06:03

## 2024-01-01 RX ADMIN — HYDROMORPHONE HYDROCHLORIDE 0.5 MG: 0.5 INJECTION, SOLUTION INTRAMUSCULAR; INTRAVENOUS; SUBCUTANEOUS at 02:03

## 2024-01-01 RX ADMIN — CALCIUM CARBONATE (ANTACID) CHEW TAB 500 MG 1000 MG: 500 CHEW TAB at 08:03

## 2024-01-01 RX ADMIN — OXYCODONE 5 MG: 5 TABLET ORAL at 11:03

## 2024-01-01 RX ADMIN — ETOPOSIDE 10 MG: 20 INJECTION, SOLUTION INTRAVENOUS at 04:03

## 2024-01-01 RX ADMIN — OXYCODONE 5 MG: 5 TABLET ORAL at 03:03

## 2024-01-01 RX ADMIN — FLUCONAZOLE 400 MG: 2 INJECTION, SOLUTION INTRAVENOUS at 04:03

## 2024-01-01 RX ADMIN — CALCIUM CARBONATE (ANTACID) CHEW TAB 500 MG 1000 MG: 500 CHEW TAB at 09:03

## 2024-01-01 RX ADMIN — PHYTONADIONE 10 MG: 10 INJECTION, EMULSION INTRAMUSCULAR; INTRAVENOUS; SUBCUTANEOUS at 09:03

## 2024-01-01 RX ADMIN — OXYCODONE HYDROCHLORIDE 10 MG: 10 TABLET ORAL at 09:03

## 2024-01-01 RX ADMIN — OXYCODONE HYDROCHLORIDE 10 MG: 10 TABLET ORAL at 08:03

## 2024-01-01 RX ADMIN — PHYTONADIONE 10 MG: 10 INJECTION, EMULSION INTRAMUSCULAR; INTRAVENOUS; SUBCUTANEOUS at 10:03

## 2024-01-01 RX ADMIN — SODIUM CHLORIDE: 9 INJECTION, SOLUTION INTRAVENOUS at 06:03

## 2024-01-01 RX ADMIN — HYDRALAZINE HYDROCHLORIDE 25 MG: 25 TABLET, FILM COATED ORAL at 06:03

## 2024-01-01 RX ADMIN — CARVEDILOL 6.25 MG: 6.25 TABLET, FILM COATED ORAL at 10:03

## 2024-01-01 RX ADMIN — MAGNESIUM SULFATE HEPTAHYDRATE 2 G: 40 INJECTION, SOLUTION INTRAVENOUS at 10:03

## 2024-01-01 RX ADMIN — Medication 24 G: at 07:03

## 2024-01-01 RX ADMIN — Medication 10 ML: at 10:03

## 2024-01-01 RX ADMIN — GUAIFENESIN AND DEXTROMETHORPHAN 5 ML: 100; 10 SYRUP ORAL at 08:03

## 2024-01-01 RX ADMIN — MAGNESIUM SULFATE HEPTAHYDRATE 2 G: 40 INJECTION, SOLUTION INTRAVENOUS at 07:03

## 2024-01-01 RX ADMIN — OXYCODONE HYDROCHLORIDE 10 MG: 10 TABLET ORAL at 02:03

## 2024-01-01 RX ADMIN — CALCIUM GLUCONATE 1 G: 20 INJECTION, SOLUTION INTRAVENOUS at 10:03

## 2024-01-01 RX ADMIN — LIDOCAINE HYDROCHLORIDE 10 ML: 20 INJECTION, SOLUTION INFILTRATION; PERINEURAL at 03:03

## 2024-01-01 RX ADMIN — ZOLEDRONIC ACID 4 MG: 4 INJECTION, SOLUTION, CONCENTRATE INTRAVENOUS at 02:03

## 2024-03-17 PROBLEM — E87.5 HYPERKALEMIA: Status: ACTIVE | Noted: 2024-01-01

## 2024-03-17 PROBLEM — E83.52 HYPERCALCEMIA: Status: ACTIVE | Noted: 2024-01-01

## 2024-03-17 PROBLEM — K76.6 PORTAL HYPERTENSION: Status: RESOLVED | Noted: 2024-01-01 | Resolved: 2024-01-01

## 2024-03-17 PROBLEM — K76.6 PORTAL HYPERTENSION: Status: ACTIVE | Noted: 2024-01-01

## 2024-03-17 PROBLEM — D75.89 BICYTOPENIA: Status: ACTIVE | Noted: 2024-01-01

## 2024-03-17 PROBLEM — E88.3 TUMOR LYSIS SYNDROME: Status: ACTIVE | Noted: 2024-01-01

## 2024-03-17 PROBLEM — I10 HTN (HYPERTENSION): Status: ACTIVE | Noted: 2024-01-01

## 2024-03-17 PROBLEM — C95.00 ACUTE LEUKEMIA: Status: ACTIVE | Noted: 2024-01-01

## 2024-03-17 PROBLEM — K59.00 CONSTIPATION: Status: ACTIVE | Noted: 2024-01-01

## 2024-03-17 PROBLEM — R41.82 AMS (ALTERED MENTAL STATUS): Status: ACTIVE | Noted: 2024-01-01

## 2024-03-17 PROBLEM — D69.6 THROMBOCYTOPENIA: Status: ACTIVE | Noted: 2024-01-01

## 2024-03-17 NOTE — PROCEDURES
Jose Armando Garg is a 43 y.o. male patient.    Temp: 97.2 °F (36.2 °C) (03/17/24 0557)  Pulse: 88 (03/17/24 0557)  Resp: 16 (03/17/24 0557)  BP: (!) 173/115 (03/17/24 0557)  SpO2: 96 % (03/17/24 0557)  Weight: 66.6 kg (146 lb 13.2 oz) (03/17/24 0700)    PICC  Date/Time: 3/17/2024 12:08 PM  Location procedure was performed: [facility] PICC LINE PLACEMENT  Performed by: Ada Cooper RN  Assisting provider: Norma Rosales LPN  Consent Done: Yes  Time out: Immediately prior to procedure a time out was called to verify the correct patient, procedure, equipment, support staff and site/side marked as required  Indications: med administration  Anesthesia: local infiltration  Local anesthetic: lidocaine 1% without epinephrine  Anesthetic Total (mL): 3  Preparation: skin prepped with ChloraPrep  Skin prep agent dried: skin prep agent completely dried prior to procedure  Sterile barriers: all five maximum sterile barriers used - cap, mask, sterile gown, sterile gloves, and large sterile sheet  Hand hygiene: hand hygiene performed prior to central venous catheter insertion  Location details: right brachial  Catheter type: double lumen  Catheter size: 5 Fr  Catheter Length: 39cm    Ultrasound guidance: yes  Vessel Caliber: medium and patent, compressibility normal  Vascular Doppler: not done  Needle advanced into vessel with real time Ultrasound guidance.  Guidewire confirmed in vessel.  Image recorded and saved.  Sterile sheath used.  no esophageal manometryNumber of attempts: 1  Post-procedure: blood return through all ports, sterile dressing applied and chlorhexidine patch  Technical procedures used: 3CG  Specimens: No  Implants: No  Assessment: placement verified by x-ray  Complications: none          Name Norma Rosales LPN   3/17/2024

## 2024-03-17 NOTE — NURSING
AAOX1, unable or refused to answer questions. Transferred from [facility] via ambulance. Dx of possible leukemia with altered mental status, general weakness, and abdominal pain/constipation. Pt refused to respond or answer questions from nurse or Dr, while assessing. N/S infusing 75ml/hr.  Ordered STAT bld work and CT of head. Unable to assess when last BM, hypoactive bowel sounds noted. Pt refused oral meds. High /115, MD made awrae, put in PRN hydralazine orders to be given after CT is done. CT not completed yet. Skin intact. Pt very lethargic, does not follow or respond to commands. Unable to complete full assessment, due to pt refusal. Attempted to contact wife, unable to get a hold of.  Bed in lowest position, side rails up x2, call light in reach.

## 2024-03-17 NOTE — H&P
[facility] - Oncology ([facility])  [facility] Medicine  History & Physical    Patient Name: Jose Armando Garg  MRN: [id number]  Admission Date: 3/17/2024  Attending Physician: Amadou Dawkins MD   Primary Care Provider: Jessica, Primary Doctor         Patient information was obtained from ER records.       Subjective:     Principal Problem:<principal problem not specified>    Chief Complaint: No chief complaint on file.       HPI: Patient is a 42 y/o male who presented to [facility] on 3/16/24 for AMS, weakness, and abdominal pain.    On arrival to [facility] patient is hypertensive, afebrile, saturating on RA.    He is lethargic, but arousable. Able to answer limited yes or no questions. Lives with wife at home. Per outside records wife brought him in to ED due to confusion, not answering questions appropriately and weakness.     He reports subjective fevers, dry cough, no hemoptysis, no hematuria, no melana.   Reports decrease appetite for a few weeks.     Labs at OSH: wbc 37.6K, blast 45%, hgb 15.7 g/dL, plt 58K, Na 131, K 5.7, Cr 0.6, uric acid 12, Ca+ 19.3, Bili 2, UA negative, COVID negative.    At OSH ED: Patient given normal saline bolus 3L, hydralazine 20mg, calcitonin  IU, morphine 4mg, zofran 4mg, vancomycin 1gm and zosyn 2.25 gm.    PMH: Unable to obtain    PSH: Unable to obtain    Review of patient's allergies indicates:  Not on File    No current facility-administered medications on file prior to encounter.     No current outpatient medications on file prior to encounter.     Family History    None       Tobacco Use    Smoking status: Not on file    Smokeless tobacco: Not on file   Substance and Sexual Activity    Alcohol use: Not on file    Drug use: Not on file    Sexual activity: Not on file     Review of Systems   Reason unable to perform ROS: Lethargic, responds to limited questions.   Constitutional:  Positive for activity change, appetite change and fever.   Respiratory:  Positive for  cough.    Cardiovascular:  Positive for palpitations.   Gastrointestinal:  Negative for blood in stool.   Genitourinary:  Negative for hematuria.     Objective:     Vital Signs (Most Recent):  Temp: 98.7 °F (37.1 °C) (03/17/24 0300)  Pulse: 90 (03/17/24 0300)  BP: (!) 158/112 (03/17/24 0335)  SpO2: (!) 93 % (03/17/24 0300) Vital Signs (24h Range):  Temp:  [98.7 °F (37.1 °C)] 98.7 °F (37.1 °C)  Pulse:  [90] 90  SpO2:  [93 %] 93 %  BP: (158-160)/(101-112) 158/112        There is no height or weight on file to calculate BMI.     Physical Exam  Constitutional:       Comments: Awake, oriented to person and place ([place]). Answers limited yes/no questions.    HENT:      Head: Normocephalic and atraumatic.   Pulmonary:      Effort: Pulmonary effort is normal.                Significant Labs: All pertinent labs within the past 24 hours have been reviewed.    Significant Imaging: I have reviewed all pertinent imaging results/findings within the past 24 hours.  Assessment/Plan:     * Acute leukemia  Concern for acute leukemia at OSH  Wbc 37.6K, blast 45% on differential     -will repeat cbc, cmp, coags, peripheral smear, flow cytometry  -start ppx anti-microbials   -allopurinol 300mg BID   -ordered transthoracic echo  -pending results will need bone marrow biopsy on Monday    AMS (altered mental status)  Likely metabolic    -obtain CTH to r/o acute bleed  -no neurological deficits per RN    Hypercalcemia  Reported 19.3 at OSH s/p calcitonin  IU. Will repeat level.     Given current symptoms (confused, lethargy) if elevated > 12 will give zometa x1.   NS 75cc/hr.     Hyperkalemia  Will repeat CMP. Likely Pseudohyperkalemia         Thrombocytopenia  Anemia and thrombocytopenia    -transfuse plt < 10K or bleeding      Tumor lysis syndrome  Uric acid 12, K 5.7 at OSH    -will repeat uric acid, cmp, phos  -allopurinol 300mg BID      HTN (hypertension)  Patient reports no history of HTN  ?Has not PCP in several years per  OSH      -Hydralazine PRN    Constipation  Last BM 1 week ago.     -Ordered KUB. If negative start stool softeners/laxatives.      VTE Risk Mitigation (From admission, onward)           Ordered     IP VTE LOW RISK PATIENT  Once         03/17/24 0336     Place sequential compression device  Until discontinued         03/17/24 0336                         The attending portion of this evaluation, treatment, and documentation was performed per Ifrah Morales MD via Telemedicine AudioVisual using the secure [employer] software platform with 2 way audio/video. The provider was located off-site and the patient is located in the hospital. The aforementioned video software was utilized to document the relevant history and physical exam        Ifrah Morales MD  Department of Hospital Medicine   [facility] - Oncology ([facility])

## 2024-03-17 NOTE — PLAN OF CARE
Nurses Note -- 4 Eyes      3/17/2024   6:35 AM      Skin assessed during: Transfer      [x] No Altered Skin Integrity Present    []Prevention Measures Documented      [] Yes- Altered Skin Integrity Present or Discovered   [] LDA Added if Not in Epic (Describe Wound)   [] New Altered Skin Integrity was Present on Admit and Documented in LDA   [] Wound Image Taken    Wound Care Consulted? No    Attending Nurse:  Debra Horn RN/Staff Member:  MISHA Bustos

## 2024-03-17 NOTE — HPI
Patient is a 44 y/o male who presented to [facility] on 3/16/24 for AMS, weakness, and abdominal pain.    On arrival to [facility] patient is hypertensive, afebrile, saturating on RA.    He is lethargic, but arousable. Able to answer limited yes or no questions. Lives with wife at home. Per outside records wife brought him in to ED due to confusion, not answering questions appropriately and weakness.     He reports subjective fevers, dry cough, no hemoptysis, no hematuria, no melana.   Reports decrease appetite for a few weeks.     Labs at OSH: wbc 37.6K, blast 45%, hgb 15.7 g/dL, plt 58K, Na 131, K 5.7, Cr 0.6, uric acid 12, Ca+ 19.3, Bili 2, UA negative, COVID negative.    At OSH ED: Patient given normal saline bolus 3L, hydralazine 20mg, calcitonin  IU, morphine 4mg, zofran 4mg, vancomycin 1gm and zosyn 2.25 gm.

## 2024-03-17 NOTE — ASSESSMENT & PLAN NOTE
Concern for acute leukemia at OSH  Wbc 37.6K, blast 45% on differential     -will repeat cbc, cmp, coags, peripheral smear, flow cytometry  -start ppx anti-microbials   -allopurinol 300mg BID   -ordered transthoracic echo  -pending results will need bone marrow biopsy on Monday

## 2024-03-17 NOTE — SUBJECTIVE & OBJECTIVE
PMH: Unable to obtain    PSH: Unable to obtain    Review of patient's allergies indicates:  Not on File    No current facility-administered medications on file prior to encounter.     No current outpatient medications on file prior to encounter.     Family History    None       Tobacco Use    Smoking status: Not on file    Smokeless tobacco: Not on file   Substance and Sexual Activity    Alcohol use: Not on file    Drug use: Not on file    Sexual activity: Not on file     Review of Systems   Reason unable to perform ROS: Lethargic, responds to limited questions.   Constitutional:  Positive for activity change, appetite change and fever.   Respiratory:  Positive for cough.    Cardiovascular:  Positive for palpitations.   Gastrointestinal:  Negative for blood in stool.   Genitourinary:  Negative for hematuria.     Objective:     Vital Signs (Most Recent):  Temp: 98.7 °F (37.1 °C) (03/17/24 0300)  Pulse: 90 (03/17/24 0300)  BP: (!) 158/112 (03/17/24 0335)  SpO2: (!) 93 % (03/17/24 0300) Vital Signs (24h Range):  Temp:  [98.7 °F (37.1 °C)] 98.7 °F (37.1 °C)  Pulse:  [90] 90  SpO2:  [93 %] 93 %  BP: (158-160)/(101-112) 158/112        There is no height or weight on file to calculate BMI.     Physical Exam  Constitutional:       Comments: Awake, oriented to person and place ([place]). Answers limited yes/no questions.    HENT:      Head: Normocephalic and atraumatic.   Pulmonary:      Effort: Pulmonary effort is normal.                Significant Labs: All pertinent labs within the past 24 hours have been reviewed.    Significant Imaging: I have reviewed all pertinent imaging results/findings within the past 24 hours.

## 2024-03-17 NOTE — ASSESSMENT & PLAN NOTE
Anemia and thrombocytopenia    -transfuse for hgb <7 g/dL or bleeding  -transfuse plt < 10K or bleeding

## 2024-03-17 NOTE — ASSESSMENT & PLAN NOTE
Reported 19.3 at OSH s/p calcitonin  IU. Will repeat level.     Given current symptoms (confused, lethargy) if elevated > 12 will give zometa x1.   NS 75cc/hr.

## 2024-03-17 NOTE — CONSULTS
D/L PICC placed in right brachial vein, 39 cm in length with 0 cm exposed. Arm circumference 27cm. Lot#[id number]

## 2024-03-18 NOTE — PT/OT/SLP PROGRESS
Occupational Therapy      Patient Name:  Jose Armando Garg   MRN:  [id number]    Patient not seen today secondary to pt underwent bone marrow biopsy and resting with pain medicine. Will follow-up 3/19.    3/18/2024

## 2024-03-18 NOTE — HOSPITAL COURSE
Patient was transferred for concerns of acute leukemia given increased blasts on CBC. He was started on prophylactic allopurinol, acyclovir, levofloxacin, and fluconazole. Had significant AMS secondary to a calcium up to 19. After treatment of calcium his mental status improved to baseline. Uric acid initially elevated but improved with IVF, held off on rasburicase and patient eventually found to be G6PD deficient. CT with cervical adenopathy and splenomegaly. 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 He was started on EPOCH while inpatient and tolerated it well. WBC improved with treatments. Labs also revealed chronic hepatitis B infection, hepatology consulted and patient was started on tenofovir. Patient is not a citizen of the [place] but is a permanent, he is in the process of getting medicaid at this time. He will need to follow with [facility], ambulatory referral sent. Will speak with our navigator to help establish care. He was discharged on 3/18.     Physical Exam  Constitutional:       Appearance: Normal appearance.   HENT:      Head: Normocephalic and atraumatic.      Mouth/Throat:      Mouth: Mucous membranes are moist.      Pharynx: Oropharynx is clear.   Eyes:      Extraocular Movements: Extraocular movements intact.      Pupils: Pupils are equal, round, and reactive to light.   Cardiovascular:      Rate and Rhythm: Normal rate and regular rhythm.      Pulses: Normal pulses.      Heart sounds: Normal heart sounds.   Pulmonary:      Effort: Pulmonary effort is normal.      Breath sounds: Normal breath sounds.   Abdominal:      General: Abdomen is flat. There is no distension.      Palpations: Abdomen is soft.      Tenderness: There is no abdominal tenderness.   Musculoskeletal:         General: Normal range of motion.      Cervical back: Normal range of motion and neck supple.      Right lower leg: No edema.      Left  lower leg: No edema.   Lymphadenopathy:      Cervical: Cervical adenopathy (improving) present.   Skin:     General: Skin is warm and dry.   Neurological:      General: No focal deficit present.      Mental Status: He is alert and oriented to person, place, and time.   Psychiatric:         Mood and Affect: Mood normal.         Behavior: Behavior normal.

## 2024-03-18 NOTE — PROGRESS NOTES
PROCEDURE NOTE:  Date of Procedure: 03/18/2024   Bone Marrow Biopsy and Aspiration  Indication: Suspected AML  Consent: Informed consent was obtained from patient.  Timeout: Done and documented.  Position: Right lateral  Site: Left posterior illiac crest.  Prep: Betadine.  Needle used: 11 gauge Jamshidi needle.  Anesthetic: 2% lidocaine 10 cc.  Biopsy: The biopsy needle was introduced into the marrow cavity and an aspirate was obtained without complications and sent for flow cytometry and cytogenetics. Core biopsy obtained without difficulty and sent for routine histologic examination.  Complications: None.  Disposition: Patient was left in the room with RN and instructed to lie on back for 20 minutes. Instructed to keep dressing dry and intact for 24 hours.   Blood loss: Minimal.     Elaine Pabon, FNP  Hematology/Oncology/Bone Marrow Transplant

## 2024-03-18 NOTE — PLAN OF CARE
Patient was disoriented X 4 at beginning of shift, toward end of shift patient able to hold conversation without out any confusion,he frequently calls family on cell phone to talk;Intermittent disorientation noted but huge improvement from this morning. VS stable. MD aware oh hypertension, PO PRN hydralazine given. One unit of cryo given. Productive cough noted, CXR completed today, guaifenesin given as scheduled. CT of soft tissue, chest,pelvis, and abdomen pending. Lasix given once. Fall and safety precautions maintained throughout day. Will continue routine care.

## 2024-03-18 NOTE — CARE UPDATE
RAPID RESPONSE NURSE ROUND       Rounding completed with charge RNIsa for hypertension reports SBP goal < 180, PRN and scheduled meds ordered and given. Current -160. No additional concerns verbalized at this time. Instructed to call [phone] for further concerns or assistance.

## 2024-03-18 NOTE — ASSESSMENT & PLAN NOTE
Uric acid 12, K 5.7 at OSH    - Allopurinol 300mg BID  - IVF   - Uric acid improving   - G6PD sent

## 2024-03-18 NOTE — PROGRESS NOTES
[facility] - Oncology ([facility])  Hematology  Bone Marrow Transplant  Progress Note    Patient Name: Jose Armando Garg  Admission Date: 3/17/2024  Hospital Length of Stay: 1 days  Code Status: Full Code    Subjective:     Interval History: K and phos low, will not replete in setting of TLS. WBC down to 19.6 from 25.5. Corrected Ca down to 11.1 from 19.3 after calcitonin and zometa. Uric acid down to 6.7 from 11.6. Will continue IVF. CT CAP with hepatosplenomegaly with ascites and diffuse anasarca. CT neck with bilateral cervical chain and right axillary lymphadenopathy. Plan for BMB today.     Objective:     Vital Signs (Most Recent):  Temp: 97.5 °F (36.4 °C) (03/18/24 0434)  Pulse: 95 (03/18/24 0434)  Resp: 18 (03/18/24 0434)  BP: 132/89 (03/18/24 0434)  SpO2: 98 % (03/18/24 0434) Vital Signs (24h Range):  Temp:  [97.2 °F (36.2 °C)-98.3 °F (36.8 °C)] 97.5 °F (36.4 °C)  Pulse:  [74-95] 95  Resp:  [16-18] 18  SpO2:  [97 %-100 %] 98 %  BP: (123-164)/() 132/89     Weight: 66.6 kg (146 lb 13.2 oz)  There is no height or weight on file to calculate BMI.  There is no height or weight on file to calculate BSA.    ECOG SCORE           [unfilled]    Intake/Output - Last 3 Shifts         03/16 0700  03/17 0659 03/17 0700  03/18 0659 03/18 0700  03/19 0659    P.O. 240 2755     I.V. (mL/kg)  1501 (22.5)     IV Piggyback  296.7     Total Intake(mL/kg) 240 4552.7 (68.4)     Urine (mL/kg/hr) 350 3100 (1.9)     Total Output 350 3100     Net -110 +1452.7            Urine Occurrence 1 x               Physical Exam  Constitutional:       Appearance: Normal appearance.   HENT:      Head: Normocephalic and atraumatic.      Mouth/Throat:      Mouth: Mucous membranes are moist.      Pharynx: Oropharynx is clear.   Eyes:      Extraocular Movements: Extraocular movements intact.      Pupils: Pupils are equal, round, and reactive to light.   Cardiovascular:      Rate and Rhythm: Normal rate and regular rhythm.      Pulses: Normal pulses.       Heart sounds: Normal heart sounds.   Pulmonary:      Effort: Pulmonary effort is normal.      Breath sounds: Normal breath sounds.   Abdominal:      General: Abdomen is flat.      Palpations: Abdomen is soft.      Tenderness: There is no abdominal tenderness.   Musculoskeletal:         General: Normal range of motion.      Cervical back: Normal range of motion and neck supple.      Right lower leg: No edema.      Left lower leg: No edema.   Skin:     General: Skin is warm and dry.   Neurological:      General: No focal deficit present.      Mental Status: He is alert and oriented to person, place, and time.   Psychiatric:         Mood and Affect: Mood normal.         Behavior: Behavior normal.            Significant Labs:   All pertinent labs from the last 24 hours have been reviewed.    Diagnostic Results:  I have reviewed all pertinent imaging results/findings within the past 24 hours.  Assessment/Plan:     * Acute leukemia  Concern for acute leukemia at OSH  Wbc 37.6K, blast 45% on differential     - Trend cbc, cmp, coags,   - Peripheral smear, flow cytometry ordered   - BMB on 3/18  - Prophylactic acyclovir, levofloxacin, and fluconzole   - Allopurinol 300mg BID, uric acid improving   - TTE with EF of 55-60%    Hypercalcemia  Reported 19.3 at OSH s/p calcitonin  IU.     - S/P zometa and calcitonin   - Calcium improving     Hyperkalemia  - Resolved     AMS (altered mental status)  Likely secondary to Hypercalcemia     - CT head with no acute findings   - Mental status almost back to baseline with normalization of calcium     HTN (hypertension)  Patient reports no history of HTN    -Hydralazine PRN    Thrombocytopenia  Anemia and thrombocytopenia    -transfuse plt < 10K or bleeding and <50    Tumor lysis syndrome  Uric acid 12, K 5.7 at OSH    - Allopurinol 300mg BID  - IVF   - Uric acid improving   - G6PD sent        VTE Risk Mitigation (From admission, onward)           Ordered     IP VTE LOW RISK PATIENT   Once         03/17/24 0336     Place sequential compression device  Until discontinued         03/17/24 0336                    Atul Blake MD  Bone Marrow Transplant  [facility] - Oncology ([facility])

## 2024-03-18 NOTE — ASSESSMENT & PLAN NOTE
Likely secondary to Hypercalcemia     - CT head with no acute findings   - Mental status almost back to baseline with normalization of calcium

## 2024-03-18 NOTE — PLAN OF CARE
Plan of care reviewed with patient. CT of Abd and pelvis completed. Bone marrow Biopsy performed at the bedside by Cm, NP. Bed alarm set. VSS, q1h patient rounds, bed in low position and wheels locked, rails up x2, call light and personal belongings within reach.    Nia Bautista   3/18/2024   4:05 PM

## 2024-03-18 NOTE — ASSESSMENT & PLAN NOTE
Concern for acute leukemia at OSH  Wbc 37.6K, blast 45% on differential     - Trend cbc, cmp, coags,   - Peripheral smear, flow cytometry ordered   - BMB on 3/18  - Prophylactic acyclovir, levofloxacin, and fluconzole   - Allopurinol 300mg BID, uric acid improving   - TTE with EF of 55-60%

## 2024-03-18 NOTE — PLAN OF CARE
AAOX4, verbal and able to make needs known. Afebrile. Up x1 assist. Condom cath in place, urine output good. N/S infusing 150ml/hr. BP <180 systolic. PO intake poor. Calcium WNL. CT w/ IV contrast not completed this shift. Bed alarm on. Bed in lowest position, side rails up x2, call light in reach.

## 2024-03-18 NOTE — SUBJECTIVE & OBJECTIVE
Subjective:     Interval History: K and phos low, will not replete in setting of TLS. WBC down to 19.6 from 25.5. Corrected Ca down to 11.1 from 19.3 after calcitonin and zometa. Uric acid down to 6.7 from 11.6. Will continue IVF. CT CAP with hepatosplenomegaly with ascites and diffuse anasarca. CT neck with bilateral cervical chain and right axillary lymphadenopathy. Plan for BMB today.     Objective:     Vital Signs (Most Recent):  Temp: 97.5 °F (36.4 °C) (03/18/24 0434)  Pulse: 95 (03/18/24 0434)  Resp: 18 (03/18/24 0434)  BP: 132/89 (03/18/24 0434)  SpO2: 98 % (03/18/24 0434) Vital Signs (24h Range):  Temp:  [97.2 °F (36.2 °C)-98.3 °F (36.8 °C)] 97.5 °F (36.4 °C)  Pulse:  [74-95] 95  Resp:  [16-18] 18  SpO2:  [97 %-100 %] 98 %  BP: (123-164)/() 132/89     Weight: 66.6 kg (146 lb 13.2 oz)  There is no height or weight on file to calculate BMI.  There is no height or weight on file to calculate BSA.    ECOG SCORE           [unfilled]    Intake/Output - Last 3 Shifts         03/16 0700  03/17 0659 03/17 0700  03/18 0659 03/18 0700  03/19 0659    P.O. 240 2755     I.V. (mL/kg)  1501 (22.5)     IV Piggyback  296.7     Total Intake(mL/kg) 240 4552.7 (68.4)     Urine (mL/kg/hr) 350 3100 (1.9)     Total Output 350 3100     Net -110 +1452.7            Urine Occurrence 1 x               Physical Exam  Constitutional:       Appearance: Normal appearance.   HENT:      Head: Normocephalic and atraumatic.      Mouth/Throat:      Mouth: Mucous membranes are moist.      Pharynx: Oropharynx is clear.   Eyes:      Extraocular Movements: Extraocular movements intact.      Pupils: Pupils are equal, round, and reactive to light.   Cardiovascular:      Rate and Rhythm: Normal rate and regular rhythm.      Pulses: Normal pulses.      Heart sounds: Normal heart sounds.   Pulmonary:      Effort: Pulmonary effort is normal.      Breath sounds: Normal breath sounds.   Abdominal:      General: Abdomen is flat.      Palpations:  Abdomen is soft.      Tenderness: There is no abdominal tenderness.   Musculoskeletal:         General: Normal range of motion.      Cervical back: Normal range of motion and neck supple.      Right lower leg: No edema.      Left lower leg: No edema.   Skin:     General: Skin is warm and dry.   Neurological:      General: No focal deficit present.      Mental Status: He is alert and oriented to person, place, and time.   Psychiatric:         Mood and Affect: Mood normal.         Behavior: Behavior normal.            Significant Labs:   All pertinent labs from the last 24 hours have been reviewed.    Diagnostic Results:  I have reviewed all pertinent imaging results/findings within the past 24 hours.

## 2024-03-18 NOTE — PROGRESS NOTES
The  went to the patient's room to complete a routine assessment. The patient was sedated.from his BMB. The  will make contact with the patient tomorrow.

## 2024-03-19 NOTE — PROGRESS NOTES
[facility] ([facility])  Hematology  Bone Marrow Transplant  Progress Note    Patient Name: Jose Armando Garg  Admission Date: 3/17/2024  Hospital Length of Stay: 2 days  Code Status: Full Code    Subjective:     Interval History: NAEON. Bone marrow biopsy yesterday was unable to get any aspirate. Three cores were sent and studies sent on peripheral blood. Calcium and uric acid normal. Patient feels much better today. WBC down to 14.6. Mag low, will replace. Slight LFT increase, will monitor.     Objective:     Vital Signs (Most Recent):  Temp: 97.7 °F (36.5 °C) (03/19/24 0819)  Pulse: 75 (03/19/24 0819)  Resp: 16 (03/19/24 0351)  BP: (!) 144/74 (03/19/24 0819)  SpO2: 97 % (03/19/24 0351) Vital Signs (24h Range):  Temp:  [97.4 °F (36.3 °C)-98.5 °F (36.9 °C)] 97.7 °F (36.5 °C)  Pulse:  [75-87] 75  Resp:  [16-18] 16  SpO2:  [95 %-100 %] 97 %  BP: (131-157)/() 144/74     Weight: 66 kg (145 lb 8.1 oz)  Body mass index is 20.29 kg/m².  Body surface area is 1.82 meters squared.    ECOG SCORE           [unfilled]    Intake/Output - Last 3 Shifts         03/17 0700  03/18 0659 03/18 0700  03/19 0659 03/19 0700  03/20 0659    P.O. 2755 325 100    I.V. (mL/kg) 1501 (22.7) 1336.5 (20.2)     IV Piggyback 296.7 356.9     Total Intake(mL/kg) 4552.7 (69) 2018.4 (30.6) 100 (1.5)    Urine (mL/kg/hr) 3100 (2) 2750 (1.7) 1000 (8.8)    Emesis/NG output  0     Stool  0     Total Output 3100 2750 1000    Net +1452.7 -731.6 -900           Stool Occurrence  1 x              Physical Exam  Constitutional:       Appearance: Normal appearance.   HENT:      Head: Normocephalic and atraumatic.      Mouth/Throat:      Mouth: Mucous membranes are moist.      Pharynx: Oropharynx is clear.   Eyes:      Extraocular Movements: Extraocular movements intact.      Pupils: Pupils are equal, round, and reactive to light.   Cardiovascular:      Rate and Rhythm: Normal rate and regular rhythm.      Pulses: Normal pulses.      Heart sounds: Normal heart  sounds.   Pulmonary:      Effort: Pulmonary effort is normal.      Breath sounds: Normal breath sounds.   Abdominal:      General: Abdomen is flat.      Palpations: Abdomen is soft.      Tenderness: There is no abdominal tenderness.   Musculoskeletal:         General: Normal range of motion.      Cervical back: Normal range of motion and neck supple.      Right lower leg: No edema.      Left lower leg: No edema.   Skin:     General: Skin is warm and dry.   Neurological:      General: No focal deficit present.      Mental Status: He is alert and oriented to person, place, and time.   Psychiatric:         Mood and Affect: Mood normal.         Behavior: Behavior normal.            Significant Labs:   All pertinent labs from the last 24 hours have been reviewed.    Diagnostic Results:  I have reviewed all pertinent imaging results/findings within the past 24 hours.  Assessment/Plan:     * Acute leukemia  Concern for acute leukemia at OSH  Wbc 37.6K, blast 45% on differential     - Trend cbc, cmp, coags,   - Peripheral smear, flow cytometry ordered   - BMB on 3/18 unable to get aspirate, three cores sent, peripheral blood studies in process  - Prophylactic acyclovir, levofloxacin, and fluconzole   - Allopurinol 300mg BID, uric acid improving   - TTE with EF of 55-60%    Hypercalcemia  Reported 19.3 at OSH s/p calcitonin  IU.     - S/P zometa and calcitonin   - Calcium normal    Hyperkalemia  - Resolved     AMS (altered mental status)  Likely secondary to Hypercalcemia     - CT head with no acute findings   - Mental status back to baseline with normalization of calcium     HTN (hypertension)  Patient reports no history of HTN    -Hydralazine PRN    Thrombocytopenia  Anemia and thrombocytopenia    -transfuse plt < 10K or bleeding and <50    Tumor lysis syndrome  Uric acid 12, K 5.7 at OSH    - Allopurinol 300mg BID  - IVF as needed  - Uric acid normal  - G6PD sent        VTE Risk Mitigation (From admission, onward)            Ordered     IP VTE LOW RISK PATIENT  Once         03/17/24 0336     Place sequential compression device  Until discontinued         03/17/24 0336                    Atul Blake MD  Bone Marrow Transplant  [facility] - Oncology ([facility])

## 2024-03-19 NOTE — SUBJECTIVE & OBJECTIVE
Subjective:     Interval History: NAEON. Bone marrow biopsy yesterday was unable to get any aspirate. Three cores were sent and studies sent on peripheral blood. Calcium and uric acid normal. Patient feels much better today. WBC down to 14.6. Mag low, will replace. Slight LFT increase, will monitor.     Objective:     Vital Signs (Most Recent):  Temp: 97.7 °F (36.5 °C) (03/19/24 0819)  Pulse: 75 (03/19/24 0819)  Resp: 16 (03/19/24 0351)  BP: (!) 144/74 (03/19/24 0819)  SpO2: 97 % (03/19/24 0351) Vital Signs (24h Range):  Temp:  [97.4 °F (36.3 °C)-98.5 °F (36.9 °C)] 97.7 °F (36.5 °C)  Pulse:  [75-87] 75  Resp:  [16-18] 16  SpO2:  [95 %-100 %] 97 %  BP: (131-157)/() 144/74     Weight: 66 kg (145 lb 8.1 oz)  Body mass index is 20.29 kg/m².  Body surface area is 1.82 meters squared.    ECOG SCORE           [unfilled]    Intake/Output - Last 3 Shifts         03/17 0700  03/18 0659 03/18 0700  03/19 0659 03/19 0700  03/20 0659    P.O. 2755 325 100    I.V. (mL/kg) 1501 (22.7) 1336.5 (20.2)     IV Piggyback 296.7 356.9     Total Intake(mL/kg) 4552.7 (69) 2018.4 (30.6) 100 (1.5)    Urine (mL/kg/hr) 3100 (2) 2750 (1.7) 1000 (8.8)    Emesis/NG output  0     Stool  0     Total Output 3100 2750 1000    Net +1452.7 -731.6 -900           Stool Occurrence  1 x              Physical Exam  Constitutional:       Appearance: Normal appearance.   HENT:      Head: Normocephalic and atraumatic.      Mouth/Throat:      Mouth: Mucous membranes are moist.      Pharynx: Oropharynx is clear.   Eyes:      Extraocular Movements: Extraocular movements intact.      Pupils: Pupils are equal, round, and reactive to light.   Cardiovascular:      Rate and Rhythm: Normal rate and regular rhythm.      Pulses: Normal pulses.      Heart sounds: Normal heart sounds.   Pulmonary:      Effort: Pulmonary effort is normal.      Breath sounds: Normal breath sounds.   Abdominal:      General: Abdomen is flat.      Palpations: Abdomen is soft.       Tenderness: There is no abdominal tenderness.   Musculoskeletal:         General: Normal range of motion.      Cervical back: Normal range of motion and neck supple.      Right lower leg: No edema.      Left lower leg: No edema.   Skin:     General: Skin is warm and dry.   Neurological:      General: No focal deficit present.      Mental Status: He is alert and oriented to person, place, and time.   Psychiatric:         Mood and Affect: Mood normal.         Behavior: Behavior normal.            Significant Labs:   All pertinent labs from the last 24 hours have been reviewed.    Diagnostic Results:  I have reviewed all pertinent imaging results/findings within the past 24 hours.

## 2024-03-19 NOTE — ASSESSMENT & PLAN NOTE
Uric acid 12, K 5.7 at OSH    - Allopurinol 300mg BID  - IVF as needed  - Uric acid normal  - G6PD sent

## 2024-03-19 NOTE — ASSESSMENT & PLAN NOTE
Concern for acute leukemia at OSH  Wbc 37.6K, blast 45% on differential     - Trend cbc, cmp, coags,   - Peripheral smear, flow cytometry ordered   - BMB on 3/18 unable to get aspirate, three cores sent, peripheral blood studies in process  - Prophylactic acyclovir, levofloxacin, and fluconzole   - Allopurinol 300mg BID, uric acid improving   - TTE with EF of 55-60%

## 2024-03-19 NOTE — PT/OT/SLP EVAL
"Physical Therapy Co-Evaluation and Discharge Note  Co-evaluation with OT for maximal pt participation, safety, and activity tolerance    Patient Name:  Jose Armando Garg   MRN:  [id number]  Admitting Diagnosis:  Acute leukemia   Recent Surgery: * No surgery found *    Admit Date: 3/17/2024  Length of Stay: 2 days    Recommendations:     Discharge Recommendations:  No Therapy Indicated   Discharge Equipment Recommendations: none   Justification for Equipment: N/A  Barriers to discharge: None    Assessment:     Jose Armando Garg is a 43 y.o. male admitted with a medical diagnosis of Acute leukemia. .  At this time, patient is functioning at their prior level of function and does not require further acute PT services.     Highest level of mobility achieved this visit: ambulates 120ft w/ no A/D and (I)    Treatment Tolerated: Excellent    Plan:     During this hospitalization, patient does not require further acute PT services.  Please re-consult if situation changes.      Subjective     RN Clara notified prior to session. No family present upon PT entrance into room.    Chief Complaint: none  Patient/Family Comments/goals: "I've been in [place] for about 3 years"  Pain/Comfort:  Pain Rating 1: 0/10    Social History:  Residence: lives with their family 1-story house with 3 WALI and 0 HR.  Support available: Spouse  Equipment Used: none  Equipment Owned (not using): None  Prior level of function: independent  Work: Disability.   Drive: yes.     Objective:     Additional staff present: OT Rowan    Patient found sitting edge of bed with peripheral IV upon PT entry to room.    General Precautions: Standard,     Orthopedic Precautions:N/A   Braces: N/A   Body mass index is 20.29 kg/m².  Oxygen Device: Room Air  Vitals: BP (!) 138/91 (Patient Position: Lying)   Pulse 90   Temp 98.1 °F (36.7 °C) (Oral)   Resp 18   Ht 5' 11" (1.803 m)   Wt 66 kg (145 lb 8.1 oz)   SpO2 97%   BMI 20.29 kg/m²     Exam:  Cognition:   Alert and " Cooperative  Command following: Follows multistep verbal commands  Fluency: clear/fluent  Skin Integrity: Visible skin intact  Edema: None noted  Sensation: Intact to light touch  Hearing: Intact  Vision:  Intact  Coordination: grossly intact  Range of Motion:  RLE: WFL  LLE: WFL  Strength Exam:  Bilateral Lower Extremity Strength: grossly WFL    Outcome Measures:  AM-PAC 6 CLICK MOBILITY  Turning over in bed (including adjusting bedclothes, sheets and blankets)?: 4  Sitting down on and standing up from a chair with arms (e.g., wheelchair, bedside commode, etc.): 4  Moving from lying on back to sitting on the side of the bed?: 4  Moving to and from a bed to a chair (including a wheelchair)?: 4  Need to walk in hospital room?: 4  Climbing 3-5 steps with a railing?: 4  Basic Mobility Total Score: 24     Functional Mobility:    Bed Mobility:   Pt found/returned to sitting bedside    Sitting Balance at Edge of Bed:  Assistance Level Required: [place]  Postural deviations noted: no deviations noted    Transfers:   Sit <> Stand Transfer: independence with no assistive device   Stand <> Sit Transfer: independence with no assistive device   x1 trials from EOB    Standing Balance:  Assistance Level Required: [place]  Patient used: no assistive device   Time: 5 min  Postural deviations noted: no deviations noted    Gait:   Patient ambulated: 120ft   Patient required: independent  Patient used:  no assistive device   Gait Pattern observed: swing-through gait  Gait Deviation(s): steady gait  all lines remained intact throughout ambulation trial    Education:  Time provided for education, counseling and discussion of health disposition in regards to patient's current status  All questions answered within PT scope of practice and to patient's satisfaction    Patient left sitting edge of bed with all lines intact and call button in reach.    Time Tracking:     PT Received On: 03/19/24  PT Start Time: 0845     PT Stop  Time: 0853  PT Total Time (min): 8 min     Billable Minutes: Evaluation 1 procedure    Mahesh Ramirez PT, DPT  3/19/2024

## 2024-03-19 NOTE — PT/OT/SLP EVAL
"Occupational Therapy  Co -  Evaluation and Discharge Note    Co-evaluation/treatment performed due to patient's multiple deficits requiring two skilled therapists to appropriately and safely assess patient's strength and endurance while facilitating functional tasks in addition to accommodating for patient's activity tolerance.       Name: Jose Armando Garg  MRN: [id number]  Admitting Diagnosis: Acute leukemia  Recent Surgery: * No surgery found *      Recommendations:     Discharge Recommendations: No Therapy Indicated  Discharge Equipment Recommendations: none  Barriers to discharge:  None    Assessment:     Jose Armando Garg is a 43 y.o. male with a medical diagnosis of Acute leukemia. At this time, patient is functioning at their prior level of function and does not require further acute OT services.  Pt engaged well in therapy, encountered sitting EOB.  Able to complete STS transfer independently, and functional mobility in hospital room and hallway independently, with PT alejandra IV line.  Pt is safe to return home with no therapy indicated. Please consult if there are any medical changes in patient care that might require therapy re-assessment.    Plan:     During this hospitalization, patient does not require further acute OT services.  Please re-consult if situation changes.    Plan of Care Reviewed with: patient    Subjective     Chief Complaint: "My back hurts from the procedure yesterday"   Patient/Family Comments/goals: Get better, return home     Occupational Profile:  Living Environment: Pt lives w wife and 2 adult children in [facility] with 3 WALI and 0 HR.  Tub/sh with 0 gb  Previous level of function: Independent, pt is not working currently, is not driving currently.   Roles and Routines:   Equipment Used at home: none  Assistance upon Discharge: family    Pain/Comfort:  Pain Rating 1: 8/10  Location - Side 1: Bilateral  Location - Orientation 1: lower  Location 1: back @ bone marrow biopsy site  Pain Addressed 1: " Distraction, Cessation of Activity  Pain Rating Post-Intervention 1:  not quantified    Patients cultural, spiritual, Catholic conflicts given the current situation: no    Objective:     Communicated with: RN prior to session.  Patient found sitting edge of bed with peripheral IV upon OT entry to room.    General Precautions: Standard, fall  Orthopedic Precautions: N/A  Braces: N/A  Respiratory Status: Room air     Occupational Performance:    Bed Mobility:    Sitting EOB with good balance    Functional Mobility/Transfers:  Patient completed Sit <> Stand Transfer with independence  with  no assistive device   Functional Mobility: Pt engaging in functional mobility to simulate household/community distance in hospital room and hallway  with independence and utilizing no AD in order to maximize functional activity tolerance and standing balance required for engagement in occupations of choice. PT towed IV pole    Activities of Daily Living:  Politely declined this date, communicated independence in this area    Cognitive/Visual Perceptual:  Cognitive/Psychosocial Skills:     -       Oriented to: AOx4   -       Follows Commands/attention:Follows multistep  commands  -       Communication: clear/fluent  -       Memory: No Deficits noted  -       Safety awareness/insight to disability: intact   Visual/Perceptual:      -Intact      Physical Exam:  Balance:    -       Sitting: Good.  Standing: Good  Postural examination/scapula alignment:    -       No postural abnormalities identified  Skin integrity: Visible skin intact  Edema:  None noted  Sensation:    -       Intact  Motor Planning:    -       Intact  Dominant hand:    -       right  Upper Extremity Range of Motion:     -       Right Upper Extremity: WFL  -       Left Upper Extremity: WFL  Upper Extremity Strength:    -       Right Upper Extremity: WFL  -       Left Upper Extremity: WFL   Strength:    -       Right Upper Extremity: WFL  -       Left Upper  Extremity: WFL  Neurological:    -       Intact    AMPAC 6 Click ADL:  AMPAC Total Score: 24    Treatment & Education:  Pt educated on role of OT, POC, and goals for therapy.    POC was dicussed with patient/caregiver, who was included in its development and is in agreement with the identified goals and treatment plan.   Patient and family aware of patient's deficits and therapy progression.   Time provided for therapeutic counseling and discussion of health disposition.   Educated on importance of EOB/OOB mobility, maintaining routine, sitting up in chair, and maximizing independence with ADLs during admission   Pt completed ADLs and functional mobility for treatment session as noted above   Pt/caregiver verbalized understanding and expressed no further concerns/questions.      Patient left sitting edge of bed with all lines intact, call button in reach, and RN notified    GOALS:   Multidisciplinary Problems       Occupational Therapy Goals       Not on file                    History:     No past medical history on file.    No past surgical history on file.    Time Tracking:     OT Date of Treatment: 03/19/24  OT Start Time: 0845  OT Stop Time: 0853  OT Total Time (min): 8 min    Billable Minutes:Evaluation 8    3/19/2024

## 2024-03-19 NOTE — PLAN OF CARE
POC reviewed with patient; understanding verbalized. Pt had complaints of some discomfort from bone marrow biopsy 3/18. PRN Oxy was given with relief. New external cath was applied. Pt. with nonskid footwear on, bed in lowest position, and locked with bed rails up x2.  Pt. instructed to call prior to getting OOB.  Pt. has call light and personal items within reach. Patient ambulates in room independently. VSS and afebrile this shift. All questions and concerns addressed at this time. Will continue to monitor.

## 2024-03-19 NOTE — PROGRESS NOTES
Admit Assessment    Patient Identification  Jose Armando Garg   :  1980  Admit Date:  3/17/2024  Attending Provider:  Amaodu Dawkins MD              Referral:   Pt was admitted to the BMT-UNIT with a diagnosis of Acute leukemia, and was admitted this hospital stay due to Acute leukemia [C95.00].    The  is involved by a routine referral to the Social Work Department. Patient presents as a 43 y.o. year old single male.with 5 children ages 2, 6, 8, 9 and 16.    The patient stated that he is from [facility], which is a city in [facility] [facility].  The patient stated that his father was a Diplomate for the [employer], he was stationed in [place]. The patient stated that his mother worked with his father in the [place] [employer], but when his father passed 5-years ago, his mother retired.   The patient stated that he can speak Czech, E[place] and Kinyarwanda as a result of living in [facility] and through his travels.    Persons interviewed : The patient only at bedside.    Living Situation:    The patient resides with his girlfriend and children in a single family home with 3-steps to the entrance at [address] [place] [address].     The patient's phone: [phone].    The patient's significant other, Tacho Hamilton's phone: [phone].    (RETIRED) Functional Status Prior  Ambulation Prior: 0-->independent  Transferrin-->independent  Toiletin-->independent    Current or Past Agencies and Description of Services/Supplies    DME  Agency Name: None.  Agency Phone Number: None.  Equipment Currently Used at Home: None.    Home Health  Agency Name: None.  Agency Phone Number: None.  Services: None.    IV Infusion  Agency Name: None.  Agency Phone Number: None.    Nutrition: Oral diet without restrictions.    Outpatient Pharmacy:   No Pharmacies Listed.    Patient Preference of agencies include: The patient has no preferences.    Patient/Caregiver  informed of right to choose providers or agencies.  Patient provides permission to release any necessary information to [facility] and to [facility] agencies as needed to facilitate patient care, treatment planning, and patient discharge planning.  Written and verbal resources provided.      Coping: The patient stated that so far, he is coping fine. He did not identified a coping mechanism.    Adjustment to Diagnosis and Treatment: The patient stated that things are so new, and he is trying to grasp everything, and that he is slowly adjusting to his diagnosis and oncoming treatment.    Emotional: The patient did not report any issues with his emotions, and that everything is good in that respect.    Behavioral: The patient did not have any issues with behavioral changes or issues.    Cognitive Issues: The patient stated that prior to coming to the hospital , he had issues with his memory, and he attributes that to his weakness at that time.    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    Finances: The patient is currently unemployed.    Housing: The patient's  housing is currently stable.    Support System: The patient has good support from his significant other.    Lupe: The patient does not endorse any Adventism.    Recreation/Hobbies/Leisure: The patient enjoys fishing.    Lupe: None.    Insurance: Pending Medicaid.    POA: None.    Living Will: None.    Advance Directives: None.    Transportation: Transportation home to be provided by the patient's significant other.    History/Current Symptoms of Anxiety/Depression: No:   History/Current Substance Use:   Social History     Tobacco Use    Smoking status: Not on file    Smokeless tobacco: Not on file   Substance and Sexual Activity    Alcohol use: Not on file    Drug use: Not on file    Sexual activity: Not on file       Indications of  Abuse/Neglect: No:   Abuse Screen (yes response referral indicated)  Feels Unsafe at Home or Work/School: unable to answer (comment required): No  Physical Signs of Abuse Present: No.    Financial:  Payer/Plan Subscr  Sex Relation Sub. Ins. ID Effective Group Num   1. MEDICAID - [place] ROXANNE CHAVEZ 1980 Male Self [id number]* 3/18/1905                                    [address] [address]        Other identified concerns/needs: Patient did not have any needs identified.    Plan: TBD    Interventions/Referrals: No referrals at this time.    Patient/caregiver engaged in treatment planning process.  Yes.     providing psychosocial and supportive counseling, resources, education, assistance and discharge planning as appropriate.  Patient/caregiver state understanding of  available resources,  following, remains available. Yes.

## 2024-03-19 NOTE — ASSESSMENT & PLAN NOTE
Likely secondary to Hypercalcemia     - CT head with no acute findings   - Mental status back to baseline with normalization of calcium

## 2024-03-19 NOTE — PLAN OF CARE
Plan of care reviewed with patient .  Fall precautions maintained, side rails up x2, call light in reach ,bed in low position and locked, nonskid socks on.  Ambulating in the hallway , has condom cath in place voiding yellow urine.  Tolerating a regular diet.  No complaints voiced.  Awaiting for test results.

## 2024-03-20 PROBLEM — B19.10 HEPATITIS B: Status: ACTIVE | Noted: 2024-01-01

## 2024-03-20 NOTE — SUBJECTIVE & OBJECTIVE
Subjective:     Interval History: NAEON. Biopsy results still pending. HB surface antigen and core antibody positive, ordered HB workup to evaluate for active/previous infection. HDV sent. HCV negative. Phos <1, will replete. Mag 1.5, will replete. Calcium and uric acid normal.     Objective:     Vital Signs (Most Recent):  Temp: 98.4 °F (36.9 °C) (03/20/24 0900)  Pulse: 99 (03/20/24 0900)  Resp: 18 (03/20/24 0917)  BP: 126/89 (03/20/24 0900)  SpO2: 97 % (03/20/24 0900) Vital Signs (24h Range):  Temp:  [97.7 °F (36.5 °C)-98.5 °F (36.9 °C)] 98.4 °F (36.9 °C)  Pulse:  [79-99] 99  Resp:  [16-20] 18  SpO2:  [96 %-100 %] 97 %  BP: (124-139)/(79-92) 126/89     Weight: 66 kg (145 lb 8.1 oz)  Body mass index is 20.29 kg/m².  Body surface area is 1.82 meters squared.    ECOG SCORE           [unfilled]    Intake/Output - Last 3 Shifts         03/18 0700  03/19 0659 03/19 0700 03/20 0659 03/20 0700 03/21 0659    P.O. 325 660     I.V. (mL/kg) 1336.5 (20.2)      IV Piggyback 356.9      Total Intake(mL/kg) 2018.4 (30.6) 660 (10)     Urine (mL/kg/hr) 2750 (1.7) 4000 (2.5) 400 (2.6)    Emesis/NG output 0      Total Output 2750 4000 400    Net -159.6 -2826 -565                    Physical Exam  Constitutional:       Appearance: Normal appearance.   HENT:      Head: Normocephalic and atraumatic.      Mouth/Throat:      Mouth: Mucous membranes are moist.      Pharynx: Oropharynx is clear.   Eyes:      Extraocular Movements: Extraocular movements intact.      Pupils: Pupils are equal, round, and reactive to light.   Cardiovascular:      Rate and Rhythm: Normal rate and regular rhythm.      Pulses: Normal pulses.      Heart sounds: Normal heart sounds.   Pulmonary:      Effort: Pulmonary effort is normal.      Breath sounds: Normal breath sounds.   Abdominal:      General: Abdomen is flat.      Palpations: Abdomen is soft.      Tenderness: There is no abdominal tenderness.   Musculoskeletal:         General: Normal range of motion.       Cervical back: Normal range of motion and neck supple.      Right lower leg: No edema.      Left lower leg: No edema.   Skin:     General: Skin is warm and dry.   Neurological:      General: No focal deficit present.      Mental Status: He is alert and oriented to person, place, and time.   Psychiatric:         Mood and Affect: Mood normal.         Behavior: Behavior normal.            Significant Labs:   All pertinent labs from the last 24 hours have been reviewed.    Diagnostic Results:  I have reviewed all pertinent imaging results/findings within the past 24 hours.

## 2024-03-20 NOTE — PROGRESS NOTES
[facility] ([facility])  Hematology  Bone Marrow Transplant  Progress Note    Patient Name: Jose Armando Garg  Admission Date: 3/17/2024  Hospital Length of Stay: 3 days  Code Status: Full Code    Subjective:     Interval History: NAEON. Biopsy results still pending. HB surface antigen and core antibody positive, ordered HB workup to evaluate for active/previous infection. HDV sent. HCV negative. Phos <1, will replete. Mag 1.5, will replete. Calcium and uric acid normal.     Objective:     Vital Signs (Most Recent):  Temp: 98.4 °F (36.9 °C) (03/20/24 0900)  Pulse: 99 (03/20/24 0900)  Resp: 18 (03/20/24 0917)  BP: 126/89 (03/20/24 0900)  SpO2: 97 % (03/20/24 0900) Vital Signs (24h Range):  Temp:  [97.7 °F (36.5 °C)-98.5 °F (36.9 °C)] 98.4 °F (36.9 °C)  Pulse:  [79-99] 99  Resp:  [16-20] 18  SpO2:  [96 %-100 %] 97 %  BP: (124-139)/(79-92) 126/89     Weight: 66 kg (145 lb 8.1 oz)  Body mass index is 20.29 kg/m².  Body surface area is 1.82 meters squared.    ECOG SCORE           [unfilled]    Intake/Output - Last 3 Shifts         03/18 0700  03/19 0659 03/19 0700 03/20 0659 03/20 0700  03/21 0659    P.O. 325 660     I.V. (mL/kg) 1336.5 (20.2)      IV Piggyback 356.9      Total Intake(mL/kg) 2018.4 (30.6) 660 (10)     Urine (mL/kg/hr) 2750 (1.7) 4000 (2.5) 400 (2.6)    Emesis/NG output 0      Total Output 2750 4000 400    Net -731.6 -1599 -275                    Physical Exam  Constitutional:       Appearance: Normal appearance.   HENT:      Head: Normocephalic and atraumatic.      Mouth/Throat:      Mouth: Mucous membranes are moist.      Pharynx: Oropharynx is clear.   Eyes:      Extraocular Movements: Extraocular movements intact.      Pupils: Pupils are equal, round, and reactive to light.   Cardiovascular:      Rate and Rhythm: Normal rate and regular rhythm.      Pulses: Normal pulses.      Heart sounds: Normal heart sounds.   Pulmonary:      Effort: Pulmonary effort is normal.      Breath sounds: Normal breath  sounds.   Abdominal:      General: Abdomen is flat.      Palpations: Abdomen is soft.      Tenderness: There is no abdominal tenderness.   Musculoskeletal:         General: Normal range of motion.      Cervical back: Normal range of motion and neck supple.      Right lower leg: No edema.      Left lower leg: No edema.   Skin:     General: Skin is warm and dry.   Neurological:      General: No focal deficit present.      Mental Status: He is alert and oriented to person, place, and time.   Psychiatric:         Mood and Affect: Mood normal.         Behavior: Behavior normal.            Significant Labs:   All pertinent labs from the last 24 hours have been reviewed.    Diagnostic Results:  I have reviewed all pertinent imaging results/findings within the past 24 hours.  Assessment/Plan:     * Acute leukemia  Concern for acute leukemia at OSH  Wbc 37.6K, blast 45% on differential     - Trend cbc, cmp, coags,   - Peripheral smear, flow cytometry ordered   - BMB on 3/18 unable to get aspirate, three cores sent, peripheral blood studies in process  - Prophylactic acyclovir, levofloxacin, and fluconzole   - Allopurinol 300mg BID, uric acid improving   - TTE with EF of 55-60%    Hepatitis B  - HB surface antigen and core antibody positive  - Labs ordered to evaluate acute vs previous HB infection   - HCV negative   - HDV in process     Hypercalcemia  Reported 19.3 at OSH s/p calcitonin  IU.     - S/P zometa and calcitonin   - Calcium normal    Hyperkalemia  - Resolved     AMS (altered mental status)  Likely secondary to Hypercalcemia     - CT head with no acute findings   - Mental status back to baseline with normalization of calcium     HTN (hypertension)  Patient reports no history of HTN    -Hydralazine PRN    Thrombocytopenia  Anemia and thrombocytopenia    -transfuse plt < 10K or bleeding and <50    Tumor lysis syndrome  Uric acid 12, K 5.7 at OSH    - Allopurinol 300mg BID  - IVF as needed  - Uric acid now  normal  - G6PD activity is 10% of mean normal, consistent with G6PD deficiency   - Avoid rasburicase         VTE Risk Mitigation (From admission, onward)           Ordered     IP VTE LOW RISK PATIENT  Once         03/17/24 0336     Place sequential compression device  Until discontinued         03/17/24 0336                      Atul Blake MD  Bone Marrow Transplant  [facility] - Oncology ([facility])

## 2024-03-20 NOTE — ASSESSMENT & PLAN NOTE
- HB surface antigen and core antibody positive  - Labs ordered to evaluate acute vs previous HB infection   - HCV negative   - HDV in process

## 2024-03-20 NOTE — PLAN OF CARE
Plan of care reviewed with patient throughout shift. Pt VSS overnight, afebrile, a&o x4, on RA. Pt c/o soreness to bmb site-- given oxy x1.  Bed alarm refused (education provided), ambulating independently, fall precautions maintained. Bed locked in lowest position, side rails up x2, call light within reach, environment clear. Encouraged pt to call nurse with any concerns.

## 2024-03-20 NOTE — ASSESSMENT & PLAN NOTE
Uric acid 12, K 5.7 at OSH    - Allopurinol 300mg BID  - IVF as needed  - Uric acid now normal  - G6PD activity is 10% of mean normal, consistent with G6PD deficiency   - Avoid rasburicase

## 2024-03-20 NOTE — PLAN OF CARE
Plan of care reviewed with patient .  Fall precautions maintained, side rails upx2 , call light in reach ,bed in low position and locked, nonskid socks on.  Patient is ambulating, voiding and tolerating a regular diet without difficulty.  Ambulating up and down the hallway .  Requesting pain medication during the shift and getting some relief.  Receiving iv antibiotics .

## 2024-03-21 NOTE — SUBJECTIVE & OBJECTIVE
Subjective:     Interval History: NAEON. Hepatology consulted for positive HBV labs. Phos and mag low, will replace.     Objective:     Vital Signs (Most Recent):  Temp: 98.6 °F (37 °C) (03/21/24 1113)  Pulse: 95 (03/21/24 1113)  Resp: 18 (03/21/24 1113)  BP: (!) 132/95 (03/21/24 1113)  SpO2: 96 % (03/21/24 1113) Vital Signs (24h Range):  Temp:  [97 °F (36.1 °C)-98.8 °F (37.1 °C)] 98.6 °F (37 °C)  Pulse:  [84-95] 95  Resp:  [17-20] 18  SpO2:  [91 %-99 %] 96 %  BP: (128-145)/(83-95) 132/95     Weight: 66 kg (145 lb 8.1 oz)  Body mass index is 20.29 kg/m².  Body surface area is 1.82 meters squared.    ECOG SCORE           [unfilled]    Intake/Output - Last 3 Shifts         03/19 0700  03/20 0659 03/20 0700  03/21 0659 03/21 0700  03/22 0659    P.O. 660 660     I.V. (mL/kg)  145.5 (2.2)     IV Piggyback  781.7     Total Intake(mL/kg) 660 (10) 1587.2 (24)     Urine (mL/kg/hr) 4000 (2.5) 2180 (1.4)     Emesis/NG output       Total Output 4000 2180     Net -3340 -592.8                     Physical Exam  Constitutional:       Appearance: Normal appearance.   HENT:      Head: Normocephalic and atraumatic.      Mouth/Throat:      Mouth: Mucous membranes are moist.      Pharynx: Oropharynx is clear.   Eyes:      Extraocular Movements: Extraocular movements intact.      Pupils: Pupils are equal, round, and reactive to light.   Cardiovascular:      Rate and Rhythm: Normal rate and regular rhythm.      Pulses: Normal pulses.      Heart sounds: Normal heart sounds.   Pulmonary:      Effort: Pulmonary effort is normal.      Breath sounds: Normal breath sounds.   Abdominal:      General: Abdomen is flat.      Palpations: Abdomen is soft.      Tenderness: There is no abdominal tenderness.   Musculoskeletal:         General: Normal range of motion.      Cervical back: Normal range of motion and neck supple.      Right lower leg: No edema.      Left lower leg: No edema.   Skin:     General: Skin is warm and dry.   Neurological:       General: No focal deficit present.      Mental Status: He is alert and oriented to person, place, and time.   Psychiatric:         Mood and Affect: Mood normal.         Behavior: Behavior normal.            Significant Labs:   All pertinent labs from the last 24 hours have been reviewed.    Diagnostic Results:  I have reviewed all pertinent imaging results/findings within the past 24 hours.

## 2024-03-21 NOTE — ASSESSMENT & PLAN NOTE
- HB surface antigen and core antibody positive  - HBV DNA   - Hepatology consulted   - Tenofovir 25mg daily started per hepatology for chronic hep B infection   - HCV and HAV negative   - HDV in process

## 2024-03-21 NOTE — PROGRESS NOTES
The patient provided the incorrect Social Security Number to the SSDI/Medicaid screener during the initial Medicaid Screening. The patient provided to the  a verbal update for the correct number.The  provided the corrected number to Debbie Knight, but now their will be a delay in processing the Medicaid Authorization without a copy of the card.  The patient stated that he nor his significant other has access to the Social Security Card because its in [place], [place] in a safe.  Debbie stated that she will update the application, but a copy of the physical card will be needed for Medicaid to continue to process the application.

## 2024-03-21 NOTE — CONSULTS
[facility]  Hepatology  Consult Note    Patient Name: Jose Armando Garg  MRN: [id number]  Admission Date: 3/17/2024  Hospital Length of Stay: 4 days  Code Status: Full Code   Attending Provider:  Dr. Rosenbaum   Consulting Provider: Melvin Griggs MD  Primary Care Physician: Jessica, Primary Doctor  Principal Problem:Acute leukemia    Inpatient consult to Hepatology  Consult performed by: Melvin Griggs MD  Consult ordered by: Atul Blake MD        Subjective:     HPI: Jose Armando Garg is a 43 y.o. male with no known past medical history who presented to [facility] as a transfer from [facility] on 03/16/2024 with altered mental status, weakness and abdominal pain.  He was hypotensive, afebrile, and lethargic on arrival.Labs at OSH: wbc 37.6K, blast 45%, hgb 15.7 g/dL, plt 58K, calcium 19.3.  He was admitted to the bone marrow unit for concern of acute leukemia.  Hematology workup pending.  In the interim, hepatitis-B surface antigen and core antibody returned positive.  HBV . LFTs mildly elevated in a hepatocellular pattern.  Hepatology consulted for management of chronic hepatitis-B.    The patient is an immigrant from [place].  He does not recollect being diagnosed with hepatitis-B at birth.  He reports he was was vaccinated as a child.  Denies the use of IV drugs, and has not had tattoos.  He is sexually active with his wife and they have children.        No past medical history on file.    No past surgical history on file.    No family history on file.    Social History     Socioeconomic History    Marital status: Unknown       No current facility-administered medications on file prior to encounter.     No current outpatient medications on file prior to encounter.       Review of patient's allergies indicates:  No Known Allergies    Review of Systems   Constitutional:  Positive for malaise/fatigue. Negative for chills and fever.   Eyes:         No yellowing of eyes    Gastrointestinal:  Negative for abdominal pain, blood in stool, diarrhea, nausea and vomiting.   Skin:         No yellowing of skin   Neurological:  Negative for focal weakness and seizures.   Psychiatric/Behavioral:  Negative for substance abuse.         Objective:     Vitals:    03/21/24 0324   BP: 128/88   Pulse: 84   Resp: 18   Temp: 97.9 °F (36.6 °C)         Constitutional: healthy,  alert,  not in acute distress, and well developed  HENT: Head: Normal, normocephalic, atraumatic.  Eyes: conjunctiva clear and sclera nonicteric  GI: soft, non-tender, without masses or organomegaly, nondistended, without guarding, and without rebound  Skin: normal color and no jaundice  Neurological: alert, oriented x3  speech normal in context and clarity  memory intact grossly  Psychiatric: mood and affect are within normal limits, pt is a good historian; no memory problems were noted      Significant Labs:  Recent Labs   Lab 03/19/24  0410 03/20/24  0356 03/21/24  0325   HGB 13.4* 13.0* 12.3*       Lab Results   Component Value Date    WBC 21.11 (H) 03/21/2024    HGB 12.3 (L) 03/21/2024    HCT 37.9 (L) 03/21/2024    MCV 86 03/21/2024    PLT 77 (L) 03/21/2024       Lab Results   Component Value Date     (L) 03/21/2024    K 3.8 03/21/2024    CL 99 03/21/2024    CO2 26 03/21/2024    BUN 8 03/21/2024    CREATININE 0.6 03/21/2024    CALCIUM 8.5 (L) 03/21/2024    ANIONGAP 9 03/21/2024       Lab Results   Component Value Date    ALT 67 (H) 03/21/2024    AST 92 (H) 03/21/2024    ALKPHOS 204 (H) 03/21/2024    BILITOT 1.8 (H) 03/21/2024       Lab Results   Component Value Date    INR 1.4 (H) 03/21/2024    INR 1.3 (H) 03/20/2024    INR 1.4 (H) 03/19/2024       Significant Imaging:  Reviewed pertinent radiology findings.       Assessment/Plan:     Jose Armando Garg is a 43 y.o. male with history of o known past medical history who presented to [facility] [place] as a transfer from [facility] on 03/16/2024 with altered mental status,  weakness and abdominal pain.  There was concern for acute leukemia based on his blood counts and a bone marrow biopsy was performed.  Incidentally found to have elevated LFTs (hepatocellular pattern) and labs suggestive of chronic hepatitis-B.    Patient is an immigrant from [place] and does not recollect being diagnosed with hepatitis-B in the past.  No high-risk activities.  On physical exam, no stigmata of chronic liver disease.    Problem List:  Chronic Hep B, Immune Active    Pertinent labs  ============  HIV nonreactive  Hep B surface antigen positive  Hep B core total antibody reactive  HBV DNA   Hepatitis C antibody negative    Recommendations:  - Start Vemlidy 25 mg daily  - Await results of Hep B e antigen and Hep B e antibody.  Encouraged patient to share his diagnosis with his wife and household members so that they can be screened, and vaccinated if possible.  - Obtain US liver with Doppler (since patient is from [place] & HCC screening recommended at the time of diagnosis)  - Obtain PEth, AFP, Hep A and Hep D serologies.   - Daily CBC, CMP    Thank you for involving us in the care of Jose Armando Garg. Please call with any additional questions, concerns or changes in the patient's clinical status. We will continue to follow.     Melvin Griggs MD  Gastroenterology Fellow PGY V  [facility]

## 2024-03-21 NOTE — ASSESSMENT & PLAN NOTE
Concern for acute leukemia at OSH  Wbc 37.6K, blast 45% on differential     - Trend cbc, cmp, coags,   - Peripheral smear, flow cytometry ordered   - BMB on 3/18 unable to get aspirate, three cores sent, peripheral blood studies in process  - Prophylactic acyclovir, levofloxacin, and fluconzole   - Allopurinol 300mg daily, uric acid normal   - TTE with EF of 55-60%

## 2024-03-21 NOTE — PROGRESS NOTES
[facility] - Oncology ([facility])  Hematology  Bone Marrow Transplant  Progress Note    Patient Name: Jose Armando Garg  Admission Date: 3/17/2024  Hospital Length of Stay: 4 days  Code Status: Full Code    Subjective:     Interval History: NAEON. Hepatology consulted for positive HBV labs. Phos and mag low, will replace.     Objective:     Vital Signs (Most Recent):  Temp: 98.6 °F (37 °C) (03/21/24 1113)  Pulse: 95 (03/21/24 1113)  Resp: 18 (03/21/24 1113)  BP: (!) 132/95 (03/21/24 1113)  SpO2: 96 % (03/21/24 1113) Vital Signs (24h Range):  Temp:  [97 °F (36.1 °C)-98.8 °F (37.1 °C)] 98.6 °F (37 °C)  Pulse:  [84-95] 95  Resp:  [17-20] 18  SpO2:  [91 %-99 %] 96 %  BP: (128-145)/(83-95) 132/95     Weight: 66 kg (145 lb 8.1 oz)  Body mass index is 20.29 kg/m².  Body surface area is 1.82 meters squared.    ECOG SCORE           [unfilled]    Intake/Output - Last 3 Shifts         03/19 0700  03/20 0659 03/20 0700  03/21 0659 03/21 0700  03/22 0659    P.O. 660 660     I.V. (mL/kg)  145.5 (2.2)     IV Piggyback  781.7     Total Intake(mL/kg) 660 (10) 1587.2 (24)     Urine (mL/kg/hr) 4000 (2.5) 2180 (1.4)     Emesis/NG output       Total Output 4000 2180     Net -3340 -592.8                     Physical Exam  Constitutional:       Appearance: Normal appearance.   HENT:      Head: Normocephalic and atraumatic.      Mouth/Throat:      Mouth: Mucous membranes are moist.      Pharynx: Oropharynx is clear.   Eyes:      Extraocular Movements: Extraocular movements intact.      Pupils: Pupils are equal, round, and reactive to light.   Cardiovascular:      Rate and Rhythm: Normal rate and regular rhythm.      Pulses: Normal pulses.      Heart sounds: Normal heart sounds.   Pulmonary:      Effort: Pulmonary effort is normal.      Breath sounds: Normal breath sounds.   Abdominal:      General: Abdomen is flat.      Palpations: Abdomen is soft.      Tenderness: There is no abdominal tenderness.   Musculoskeletal:         General: Normal range of  motion.      Cervical back: Normal range of motion and neck supple.      Right lower leg: No edema.      Left lower leg: No edema.   Skin:     General: Skin is warm and dry.   Neurological:      General: No focal deficit present.      Mental Status: He is alert and oriented to person, place, and time.   Psychiatric:         Mood and Affect: Mood normal.         Behavior: Behavior normal.            Significant Labs:   All pertinent labs from the last 24 hours have been reviewed.    Diagnostic Results:  I have reviewed all pertinent imaging results/findings within the past 24 hours.  Assessment/Plan:     * Acute leukemia  Concern for acute leukemia at OSH  Wbc 37.6K, blast 45% on differential     - Trend cbc, cmp, coags,   - Peripheral smear, flow cytometry ordered   - BMB on 3/18 unable to get aspirate, three cores sent, peripheral blood studies in process  - Prophylactic acyclovir, levofloxacin, and fluconzole   - Allopurinol 300mg daily, uric acid normal   - TTE with EF of 55-60%    Hepatitis B  - HB surface antigen and core antibody positive  - HBV DNA   - Hepatology consulted   - Tenofovir 25mg daily started per hepatology for chronic hep B infection   - HCV and HAV negative   - HDV in process       Hypercalcemia  Reported 19.3 at OSH s/p calcitonin  IU.     - S/P zometa and calcitonin   - Calcium normal    Hyperkalemia  - Resolved     AMS (altered mental status)  Likely secondary to Hypercalcemia     - CT head with no acute findings   - Mental status back to baseline with normalization of calcium     HTN (hypertension)  Patient reports no history of HTN    -Hydralazine PRN    Thrombocytopenia  Anemia and thrombocytopenia    -transfuse plt < 10K or bleeding and <50    Tumor lysis syndrome  Uric acid 12, K 5.7 at OSH    - Allopurinol 300mg daily   - IVF as needed  - Uric acid now normal  - G6PD activity is 10% of mean normal, consistent with G6PD deficiency   - Avoid rasburicase         VTE Risk  Mitigation (From admission, onward)           Ordered     IP VTE LOW RISK PATIENT  Once         03/17/24 0336     Place sequential compression device  Until discontinued         03/17/24 0336                    Atul Blake MD  Bone Marrow Transplant  [facility] - [facility] ([facility])

## 2024-03-21 NOTE — ASSESSMENT & PLAN NOTE
Uric acid 12, K 5.7 at OSH    - Allopurinol 300mg daily   - IVF as needed  - Uric acid now normal  - G6PD activity is 10% of mean normal, consistent with G6PD deficiency   - Avoid rasburicase

## 2024-03-21 NOTE — PLAN OF CARE
Pt received electrolytes during shift.  POC reviewed with patient; understanding verbalized. Pt. with nonskid footwear on, bed in lowest position, and locked with bed rails up x2.  Pt. instructed to call prior to getting OOB.  Pt. has call light and personal items within reach. Patient ambulates in room independently. VSS and afebrile this shift. All questions and concerns addressed at this time. Will continue to monitor.

## 2024-03-22 PROBLEM — C91.50: Status: ACTIVE | Noted: 2024-01-01

## 2024-03-22 NOTE — ASSESSMENT & PLAN NOTE
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

## 2024-03-22 NOTE — PLAN OF CARE
Patient AAOx4. Afebrile and VSS on room air. Patient received PRN oxy x2 for pain at biopsy site. Moderate relief obtained. Mag, Potassium, and Phospate replaced overnight. Patient is up independently to the bathroom and urinal provided and within reach. Free from falls and injury. Family at bedside. Bed locked and in lowest position, non-skid socks on, and call light within reach. Patient educated on using the call light when needing assistance and verbalizes understanding. Plan of care reviewed and is ongoing. Patient expresses no other needs at this time.

## 2024-03-22 NOTE — SUBJECTIVE & OBJECTIVE
Subjective:     Interval History: NAEON. Patient started on tenofovir for chronic HBV per hepatology recs. 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 WBC up to 37, LDH up to 3600. LFT's and Tbili slightly increased. Phos low will replace. INR 1.6, will give vitamin K.     Objective:     Vital Signs (Most Recent):  Temp: 98 °F (36.7 °C) (03/22/24 1510)  Pulse: 98 (03/22/24 1510)  Resp: 18 (03/22/24 1510)  BP: (!) 147/99 (03/22/24 1510)  SpO2: 98 % (03/22/24 1510) Vital Signs (24h Range):  Temp:  [98 °F (36.7 °C)-98.6 °F (37 °C)] 98 °F (36.7 °C)  Pulse:  [] 98  Resp:  [17-18] 18  SpO2:  [97 %-98 %] 98 %  BP: (135-147)/(82-99) 147/99     Weight: 66 kg (145 lb 8.1 oz)  Body mass index is 20.29 kg/m².  Body surface area is 1.82 meters squared.    ECOG SCORE           [unfilled]    Intake/Output - Last 3 Shifts         03/20 0700  03/21 0659 03/21 0700  03/22 0659 03/22 0700  03/23 0659    P.O. 660 450     I.V. (mL/kg) 145.5 (2.2) 98.8 (1.5)     Other  20     IV Piggyback 781.7 1027.2     Total Intake(mL/kg) 1587.2 (24) 1595.9 (24.2)     Urine (mL/kg/hr) 2180 (1.4) 600 (0.4)     Total Output 2180 600     Net -592.8 +995.9            Urine Occurrence  500 x     Stool Occurrence  1 x              Physical Exam  Constitutional:       Appearance: Normal appearance.   HENT:      Head: Normocephalic and atraumatic.      Mouth/Throat:      Mouth: Mucous membranes are moist.      Pharynx: Oropharynx is clear.   Eyes:      Extraocular Movements: Extraocular movements intact.      Pupils: Pupils are equal, round, and reactive to light.   Cardiovascular:      Rate and Rhythm: Normal rate and regular rhythm.      Pulses: Normal pulses.      Heart sounds: Normal heart sounds.   Pulmonary:      Effort: Pulmonary effort is normal.      Breath sounds: Normal breath sounds.   Abdominal:      General: Abdomen is flat.      Palpations: Abdomen is soft.      Tenderness: There is no abdominal tenderness.    Musculoskeletal:         General: Normal range of motion.      Cervical back: Normal range of motion and neck supple.      Right lower leg: No edema.      Left lower leg: No edema.   Skin:     General: Skin is warm and dry.   Neurological:      General: No focal deficit present.      Mental Status: He is alert and oriented to person, place, and time.   Psychiatric:         Mood and Affect: Mood normal.         Behavior: Behavior normal.            Significant Labs:   All pertinent labs from the last 24 hours have been reviewed.    Diagnostic Results:  I have reviewed all pertinent imaging results/findings within the past 24 hours.

## 2024-03-22 NOTE — PROGRESS NOTES
[facility] - Oncology ([facility])  Hematology  Bone Marrow Transplant  Progress Note    Patient Name: Jose Armando Garg  Admission Date: 3/17/2024  Hospital Length of Stay: 5 days  Code Status: Full Code    Subjective:     Interval History: NAEON. Patient started on tenofovir for chronic HBV per hepatology recs. 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 WBC up to 37, LDH up to 3600. LFT's and Tbili slightly increased. Phos low will replace. INR 1.6, will give vitamin K.     Objective:     Vital Signs (Most Recent):  Temp: 98 °F (36.7 °C) (03/22/24 1510)  Pulse: 98 (03/22/24 1510)  Resp: 18 (03/22/24 1510)  BP: (!) 147/99 (03/22/24 1510)  SpO2: 98 % (03/22/24 1510) Vital Signs (24h Range):  Temp:  [98 °F (36.7 °C)-98.6 °F (37 °C)] 98 °F (36.7 °C)  Pulse:  [] 98  Resp:  [17-18] 18  SpO2:  [97 %-98 %] 98 %  BP: (135-147)/(82-99) 147/99     Weight: 66 kg (145 lb 8.1 oz)  Body mass index is 20.29 kg/m².  Body surface area is 1.82 meters squared.    ECOG SCORE           [unfilled]    Intake/Output - Last 3 Shifts         03/20 0700  03/21 0659 03/21 0700  03/22 0659 03/22 0700  03/23 0659    P.O. 660 450     I.V. (mL/kg) 145.5 (2.2) 98.8 (1.5)     Other  20     IV Piggyback 781.7 1027.2     Total Intake(mL/kg) 1587.2 (24) 1595.9 (24.2)     Urine (mL/kg/hr) 2180 (1.4) 600 (0.4)     Total Output 2180 600     Net -592.8 +995.9            Urine Occurrence  500 x     Stool Occurrence  1 x              Physical Exam  Constitutional:       Appearance: Normal appearance.   HENT:      Head: Normocephalic and atraumatic.      Mouth/Throat:      Mouth: Mucous membranes are moist.      Pharynx: Oropharynx is clear.   Eyes:      Extraocular Movements: Extraocular movements intact.      Pupils: Pupils are equal, round, and reactive to light.   Cardiovascular:      Rate and Rhythm: Normal rate and regular rhythm.      Pulses: Normal pulses.      Heart sounds: Normal heart sounds.   Pulmonary:      Effort:  Pulmonary effort is normal.      Breath sounds: Normal breath sounds.   Abdominal:      General: Abdomen is flat.      Palpations: Abdomen is soft.      Tenderness: There is no abdominal tenderness.   Musculoskeletal:         General: Normal range of motion.      Cervical back: Normal range of motion and neck supple.      Right lower leg: No edema.      Left lower leg: No edema.   Skin:     General: Skin is warm and dry.   Neurological:      General: No focal deficit present.      Mental Status: He is alert and oriented to person, place, and time.   Psychiatric:         Mood and Affect: Mood normal.         Behavior: Behavior normal.            Significant Labs:   All pertinent labs from the last 24 hours have been reviewed.    Diagnostic Results:  I have reviewed all pertinent imaging results/findings within the past 24 hours.  Assessment/Plan:     * Adult T-cell leukemia/lymphoma  Concern for acute leukemia at OSH  Wbc 37.6K, blast 45% on differential     - Trend cbc, cmp, coags,   - Peripheral smear, flow cytometry ordered   - BMB on 3/18 with  adult T-cell leukemia/lymphoma  - Prophylactic acyclovir, levofloxacin, and fluconzole   - Allopurinol 300mg daily, uric acid normal   - TTE with EF of 55-60%    Hepatitis B  - HB surface antigen and core antibody positive  - HBV DNA   - Hepatology consulted   - Tenofovir 25mg daily started per hepatology for chronic hep B infection   - HCV and HAV negative   - HDV in process       Hypercalcemia  Reported 19.3 at OSH s/p calcitonin  IU.     - S/P zometa and calcitonin   - Calcium normal    Hyperkalemia  - Resolved     AMS (altered mental status)  Likely secondary to Hypercalcemia     - CT head with no acute findings   - Mental status back to baseline with normalization of calcium     HTN (hypertension)  Patient reports no history of HTN    -Hydralazine PRN    Thrombocytopenia  Anemia and thrombocytopenia    -transfuse plt < 10K or bleeding and <50    Tumor  lysis syndrome  Uric acid 12, K 5.7 at OSH    - Allopurinol 300mg daily   - IVF as needed  - Uric acid now normal  - G6PD activity is 10% of mean normal, consistent with G6PD deficiency   - Avoid rasburicase         VTE Risk Mitigation (From admission, onward)           Ordered     IP VTE LOW RISK PATIENT  Once         03/17/24 0336     Place sequential compression device  Until discontinued         03/17/24 0336                      Atul Blake MD  Bone Marrow Transplant  [facility] - [facility] ([facility])

## 2024-03-22 NOTE — ASSESSMENT & PLAN NOTE
Concern for acute leukemia at OSH  Wbc 37.6K, blast 45% on differential     - Trend cbc, cmp, coags,   - Peripheral smear, flow cytometry ordered   - BMB on 3/18 with  adult T-cell leukemia/lymphoma  - HTLV-1 Detected   - Prophylactic acyclovir, levofloxacin, and fluconzole   - Allopurinol 300mg daily, uric acid normal   - TTE with EF of 55-60%  - C1D1 EPOCH on 3/23

## 2024-03-23 NOTE — PROGRESS NOTES
[facility] - Oncology ([facility])  Hematology  Bone Marrow Transplant  Progress Note    Patient Name: Jose Armando Garg  Admission Date: 3/17/2024  Hospital Length of Stay: 6 days  Code Status: Full Code    Subjective:     Interval History: NAEON. Pseudohyperkalemic today in the setting of WBC 64k. No overt evidence of TLS though LDH increasing. Will begin EPOCH today. Consented    Objective:     Vital Signs (Most Recent):  Temp: 98 °F (36.7 °C) (03/23/24 1503)  Pulse: (!) 139 (03/23/24 1800)  Resp: 18 (03/23/24 1646)  BP: (!) 140/89 (03/23/24 1503)  SpO2: 97 % (03/23/24 1503) Vital Signs (24h Range):  Temp:  [97.9 °F (36.6 °C)-98.8 °F (37.1 °C)] 98 °F (36.7 °C)  Pulse:  [] 139  Resp:  [16-20] 18  SpO2:  [90 %-99 %] 97 %  BP: (132-140)/(75-92) 140/89     Weight: 70.1 kg (154 lb 10.4 oz)  Body mass index is 21.57 kg/m².  Body surface area is 1.87 meters squared.    ECOG SCORE           [unfilled]    Intake/Output - Last 3 Shifts         03/21 0700  03/22 0659 03/22 0700  03/23 0659 03/23 0700  03/24 0659    P.O. 450 450 500    I.V. (mL/kg) 98.8 (1.5)  30 (0.4)    Other 20      IV Piggyback 1027.2 484.1     Total Intake(mL/kg) 1595.9 (24.2) 934.1 (14.2) 530 (7.6)    Urine (mL/kg/hr) 600 (0.4) 400 (0.3) 800 (1)    Total Output 600 400 800    Net +995.9 +534.1 -270           Urine Occurrence 500 x      Stool Occurrence 1 x               Physical Exam  Constitutional:       Appearance: Normal appearance.   HENT:      Head: Normocephalic and atraumatic.      Mouth/Throat:      Mouth: Mucous membranes are moist.      Pharynx: Oropharynx is clear.   Eyes:      Extraocular Movements: Extraocular movements intact.      Pupils: Pupils are equal, round, and reactive to light.   Cardiovascular:      Rate and Rhythm: Normal rate and regular rhythm.      Pulses: Normal pulses.      Heart sounds: Normal heart sounds.   Pulmonary:      Effort: Pulmonary effort is normal.      Breath sounds: Normal breath sounds.   Abdominal:      General:  Abdomen is flat.      Palpations: Abdomen is soft.      Tenderness: There is no abdominal tenderness.   Musculoskeletal:         General: Normal range of motion.      Cervical back: Normal range of motion and neck supple.      Right lower leg: No edema.      Left lower leg: No edema.   Skin:     General: Skin is warm and dry.   Neurological:      General: No focal deficit present.      Mental Status: He is alert and oriented to person, place, and time.   Psychiatric:         Mood and Affect: Mood normal.         Behavior: Behavior normal.            Significant Labs:   All pertinent labs from the last 24 hours have been reviewed.    Diagnostic Results:  I have reviewed all pertinent imaging results/findings within the past 24 hours.  Assessment/Plan:     * Adult T-cell leukemia/lymphoma  Concern for acute leukemia at OSH  Wbc 37.6K, blast 45% on differential     - Trend cbc, cmp, coags,   - Peripheral smear, flow cytometry ordered   - BMB on 3/18 with  adult T-cell leukemia/lymphoma  - HTLV-1 Detected   - Prophylactic acyclovir, levofloxacin, and fluconzole   - Allopurinol 300mg daily, uric acid normal   - TTE with EF of 55-60%  - C1D1 EPOCH on 3/23    Hepatitis B  - HB surface antigen and core antibody positive  - HBV DNA   - Hepatology consulted   - Tenofovir 25mg daily started per hepatology for chronic hep B infection   - HCV and HAV negative   - HDV in process       Hypercalcemia  Reported 19.3 at OSH s/p calcitonin  IU.     - S/P zometa and calcitonin   - Calcium normal    Hyperkalemia  - Resolved     AMS (altered mental status)  Likely secondary to Hypercalcemia     - CT head with no acute findings   - Mental status back to baseline with normalization of calcium     HTN (hypertension)  Patient reports no history of HTN    -Hydralazine PRN    Thrombocytopenia  Anemia and thrombocytopenia    -transfuse plt < 10K or bleeding and <50    Tumor lysis syndrome  Uric acid 12, K 5.7 at OSH    -  Allopurinol 300mg daily   - IVF as needed  - Uric acid now normal  - G6PD activity is 10% of mean normal, consistent with G6PD deficiency   - Avoid rasburicase         VTE Risk Mitigation (From admission, onward)           Ordered     heparin, porcine (PF) 100 unit/mL injection flush 500 Units  As needed (PRN)         03/23/24 1331     IP VTE LOW RISK PATIENT  Once         03/17/24 0336     Place sequential compression device  Until discontinued         03/17/24 0336                    Disposition: tbd    Jan Izaguirre MD  Bone Marrow Transplant  [facility] - Oncology ([facility])

## 2024-03-23 NOTE — SUBJECTIVE & OBJECTIVE
Subjective:     Interval History: NAEON. Pseudohyperkalemic today in the setting of WBC 64k. No overt evidence of TLS though LDH increasing. Will begin EPOCH today. Consented    Objective:     Vital Signs (Most Recent):  Temp: 98 °F (36.7 °C) (03/23/24 1503)  Pulse: (!) 139 (03/23/24 1800)  Resp: 18 (03/23/24 1646)  BP: (!) 140/89 (03/23/24 1503)  SpO2: 97 % (03/23/24 1503) Vital Signs (24h Range):  Temp:  [97.9 °F (36.6 °C)-98.8 °F (37.1 °C)] 98 °F (36.7 °C)  Pulse:  [] 139  Resp:  [16-20] 18  SpO2:  [90 %-99 %] 97 %  BP: (132-140)/(75-92) 140/89     Weight: 70.1 kg (154 lb 10.4 oz)  Body mass index is 21.57 kg/m².  Body surface area is 1.87 meters squared.    ECOG SCORE           [unfilled]    Intake/Output - Last 3 Shifts         03/21 0700  03/22 0659 03/22 0700  03/23 0659 03/23 0700  03/24 0659    P.O. 450 450 500    I.V. (mL/kg) 98.8 (1.5)  30 (0.4)    Other 20      IV Piggyback 1027.2 484.1     Total Intake(mL/kg) 1595.9 (24.2) 934.1 (14.2) 530 (7.6)    Urine (mL/kg/hr) 600 (0.4) 400 (0.3) 800 (1)    Total Output 600 400 800    Net +995.9 +534.1 -270           Urine Occurrence 500 x      Stool Occurrence 1 x               Physical Exam  Constitutional:       Appearance: Normal appearance.   HENT:      Head: Normocephalic and atraumatic.      Mouth/Throat:      Mouth: Mucous membranes are moist.      Pharynx: Oropharynx is clear.   Eyes:      Extraocular Movements: Extraocular movements intact.      Pupils: Pupils are equal, round, and reactive to light.   Cardiovascular:      Rate and Rhythm: Normal rate and regular rhythm.      Pulses: Normal pulses.      Heart sounds: Normal heart sounds.   Pulmonary:      Effort: Pulmonary effort is normal.      Breath sounds: Normal breath sounds.   Abdominal:      General: Abdomen is flat.      Palpations: Abdomen is soft.      Tenderness: There is no abdominal tenderness.   Musculoskeletal:         General: Normal range of motion.      Cervical back: Normal range  of motion and neck supple.      Right lower leg: No edema.      Left lower leg: No edema.   Skin:     General: Skin is warm and dry.   Neurological:      General: No focal deficit present.      Mental Status: He is alert and oriented to person, place, and time.   Psychiatric:         Mood and Affect: Mood normal.         Behavior: Behavior normal.            Significant Labs:   All pertinent labs from the last 24 hours have been reviewed.    Diagnostic Results:  I have reviewed all pertinent imaging results/findings within the past 24 hours.

## 2024-03-23 NOTE — PLAN OF CARE
Patient AAOx4. POC reviewed with patient and spouse; understanding verbalized. BG was 48 at beginning of shift, MD notified and glucose tabs given. Recheck it was 88. Potassiom 5.7 this morning. Fluids started and stat albuterol given. Redraw was 6.4, MD notified. ISTAT drawn and potassium was 4.5. MD said high potassium results were due to lymphoma and we'll continue to monitor it. Chemo hung by chemo certified RN, pre meds given. HR tachy 120-130 @ end of  Pt. with nonskid footwear on, bed in lowest position, and locked with bed rails up x2. Bed alarm refused and family to remain at bedside. Pt instructed to call for assistance as needed. Pt. has call light and personal items within reach. Patient ambulates in room independently. VSS and afebrile this shift. All questions and concerns addressed at this time.

## 2024-03-23 NOTE — PLAN OF CARE
Patient AAOx4. Afebrile and VSS on room air. Patient received PRN oxy as ordered for pain at biopsy site. Moderate relief obtained. Patient is up independently to the bathroom and urinal provided and within reach. Free from falls and injury. Family at bedside. Bed locked and in lowest position, non-skid socks on, and call light within reach. Patient educated on using the call light when needing assistance and verbalizes understanding. Plan of care reviewed and is ongoing. Patient expresses no other needs at this time

## 2024-03-23 NOTE — TREATMENT PLAN
Hepatology Treatment Plan    Jose Armando Garg is a 43 y.o. male admitted to hospital 3/17/2024 (Hospital Day: 7) due to Adult T-cell leukemia/lymphoma.     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     , ALT 91, total bilirubin 2.3    Objective  Temp:  [97.9 °F (36.6 °C)-98.8 °F (37.1 °C)] 97.9 °F (36.6 °C) (03/23 1109)  Pulse:  [] 107 (03/23 1109)  BP: (132-147)/(75-99) 133/92 (03/23 1109)  Resp:  [16-20] 18 (03/23 1109)  SpO2:  [90 %-99 %] 99 % (03/23 1109)        Laboratory    Lab Results   Component Value Date    WBC 62.98 (HH) 03/23/2024    HGB 11.5 (L) 03/23/2024    HCT 36 03/23/2024    MCV 84 03/23/2024    PLT 79 (L) 03/23/2024       Lab Results   Component Value Date     (L) 03/23/2024    K 6.4 (HH) 03/23/2024    CL 95 03/23/2024    CO2 23 03/23/2024    BUN 20 03/23/2024    CREATININE 0.8 03/23/2024    CALCIUM 9.0 03/23/2024       Lab Results   Component Value Date    ALBUMIN 2.7 (L) 03/23/2024    ALT 97 (H) 03/23/2024     (H) 03/23/2024    ALKPHOS 295 (H) 03/23/2024    BILITOT 2.4 (H) 03/23/2024       Lab Results   Component Value Date    INR 1.5 (H) 03/23/2024    INR 3.8 (H) 03/23/2024    INR 1.6 (H) 03/22/2024       MELD 3.0: 20 at 3/23/2024 10:22 AM  MELD-Na: 21 at 3/23/2024 10:22 AM  Calculated from:  Serum Creatinine: 0.8 mg/dL (Using min of 1 mg/dL) at 3/23/2024 10:22 AM  Serum Sodium: 129 mmol/L at 3/23/2024 10:22 AM  Total Bilirubin: 2.4 mg/dL at 3/23/2024 10:22 AM  Serum Albumin: 2.7 g/dL at 3/23/2024 10:22 AM  INR(ratio): 1.5 at 3/23/2024 10:22 AM  Age at listing (hypothetical): 43 years  Sex: Male at 3/23/2024 10:22 AM      Assessment  Jose Armando Garg is a 43 y.o. male with no known past medical history who presented to [facility] as a transfer from [facility] on 03/16/2024 with altered mental status, weakness and abdominal pain.  There was concern for acute leukemia based on his blood counts and a bone marrow biopsy was  performed, diagnosed with adult T-cell leukemia/lymphoma.  Incidentally found to have elevated LFTs (hepatocellular pattern) and labs suggestive of chronic hepatitis-B.     Patient is an immigrant from [place] and does not recollect being diagnosed with hepatitis-B in the past.  No high-risk activities.  On physical exam, no stigmata of chronic liver disease.     Problem List:  Chronic Hep B, Immune Active  Immune to Hep A     Pertinent workup  ==============  HIV nonreactive  Hep A IgM nonreactive  Hep A IgG Reactive   Hep B surface antigen positive  Hep B core total antibody reactive  HBV DNA   Hepatitis C antibody negative    US liver hepatomegaly with satisfactory Doppler evaluation       Plan  - Continue Vemlidy 25 mg once daily. BMT team planning on  EPOCH chemotherapy.    - Await results of Hep B e antigen and Hep B e antibody.  Encouraged patient to share his diagnosis with his wife and household members so that they can be screened, and vaccinated if possible.      - please obtain daily CBC, CMP    - Plan of care was discussed with primary team. We will follow peripherally.       Thank you for involving us in the care of Jose Armando Garg. Please call with any additional concerns or questions.      Melvin Griggs MD, PGY-V  Gastroenterology Fellow  [facility]

## 2024-03-24 PROBLEM — R41.82 AMS (ALTERED MENTAL STATUS): Status: RESOLVED | Noted: 2024-01-01 | Resolved: 2024-01-01

## 2024-03-24 NOTE — SUBJECTIVE & OBJECTIVE
Subjective:     Interval History: Day 2 EPOCH. Refers LN on neck are much improved. LDH increasing to 8.1 and WBC now 80k. No complaints, however.  Shifted overnight over a K of 7.1 and concerns for chest pain however when asked this morning he referred a coughing spell and not overt chest pain. iSTAT again discordant to BMP (5.1 this am), again suggesting pseudohyperkalemia.    Objective:     Vital Signs (Most Recent):  Temp: 97.4 °F (36.3 °C) (03/24/24 1109)  Pulse: 98 (03/24/24 1149)  Resp: 18 (03/24/24 1402)  BP: (!) 145/99 (03/24/24 1109)  SpO2: 97 % (03/24/24 1109) Vital Signs (24h Range):  Temp:  [97.4 °F (36.3 °C)-99.5 °F (37.5 °C)] 97.4 °F (36.3 °C)  Pulse:  [] 98  Resp:  [16-20] 18  SpO2:  [95 %-99 %] 97 %  BP: (136-158)/() 145/99     Weight: 70.1 kg (154 lb 10.4 oz)  Body mass index is 21.57 kg/m².  Body surface area is 1.87 meters squared.    ECOG SCORE           [unfilled]    Intake/Output - Last 3 Shifts         03/22 0700  03/23 0659 03/23 0700  03/24 0659 03/24 0700  03/25 0659    P.O. 450 1575 500    I.V. (mL/kg)  1440.5 (20.5)     Other       IV Piggyback 484.1 905.3     Total Intake(mL/kg) 934.1 (14.2) 3920.8 (55.9) 500 (7.1)    Urine (mL/kg/hr) 400 (0.3) 2550 (1.5)     Total Output 400 2550     Net +534.1 +1370.8 +500                    Physical Exam  Constitutional:       Appearance: Normal appearance.   HENT:      Head: Normocephalic and atraumatic.      Mouth/Throat:      Mouth: Mucous membranes are moist.      Pharynx: Oropharynx is clear.   Eyes:      Extraocular Movements: Extraocular movements intact.      Pupils: Pupils are equal, round, and reactive to light.   Cardiovascular:      Rate and Rhythm: Normal rate and regular rhythm.      Pulses: Normal pulses.      Heart sounds: Normal heart sounds.   Pulmonary:      Effort: Pulmonary effort is normal.      Breath sounds: Normal breath sounds.   Abdominal:      General: Abdomen is flat. There is distension.      Palpations:  Abdomen is soft.      Tenderness: There is no abdominal tenderness.   Musculoskeletal:         General: Normal range of motion.      Cervical back: Normal range of motion and neck supple.      Right lower leg: No edema.      Left lower leg: No edema.   Lymphadenopathy:      Cervical: Cervical adenopathy present.   Skin:     General: Skin is warm and dry.   Neurological:      General: No focal deficit present.      Mental Status: He is alert and oriented to person, place, and time.   Psychiatric:         Mood and Affect: Mood normal.         Behavior: Behavior normal.            Significant Labs:   All pertinent labs from the last 24 hours have been reviewed.    Diagnostic Results:  I have reviewed all pertinent imaging results/findings within the past 24 hours.

## 2024-03-24 NOTE — RESPIRATORY THERAPY
RAPID RESPONSE RESPIRATORY CHART CHECK       Chart check completed. Please call [phone] for further concerns or assistance.

## 2024-03-24 NOTE — ASSESSMENT & PLAN NOTE
Concern for acute leukemia at OSH  Wbc 37.6K, blast 45% on differential     - Trend cbc, cmp, coags,   - Peripheral smear, flow cytometry ordered   - BMB on 3/18 with  adult T-cell leukemia/lymphoma  - HTLV-1 Detected   - Prophylactic acyclovir, levofloxacin, and fluconzole   - Allopurinol 300mg daily, uric acid normal   - TTE with EF of 55-60%  - C1D2 EPOCH, tolerating well

## 2024-03-24 NOTE — RESPIRATORY THERAPY
RAPID RESPONSE RESPIRATORY CHART CHECK       Chart check completed. Patient with HFNC order, pt on RA. Order d/c. Please call [phone] for further concerns or assistance.

## 2024-03-24 NOTE — PROGRESS NOTES
[facility] ([facility])  Hematology  Bone Marrow Transplant  Progress Note    Patient Name: Jose Armando Garg  Admission Date: 3/17/2024  Hospital Length of Stay: 7 days  Code Status: Full Code    Subjective:     Interval History: Day 2 EPOCH. Refers LN on neck are much improved. LDH increasing to 8.1 and WBC now 80k. No complaints, however.  Shifted overnight over a K of 7.1 and concerns for chest pain however when asked this morning he referred a coughing spell and not overt chest pain. iSTAT again discordant to BMP (5.1 this am), again suggesting pseudohyperkalemia.    Objective:     Vital Signs (Most Recent):  Temp: 97.4 °F (36.3 °C) (03/24/24 1109)  Pulse: 98 (03/24/24 1149)  Resp: 18 (03/24/24 1402)  BP: (!) 145/99 (03/24/24 1109)  SpO2: 97 % (03/24/24 1109) Vital Signs (24h Range):  Temp:  [97.4 °F (36.3 °C)-99.5 °F (37.5 °C)] 97.4 °F (36.3 °C)  Pulse:  [] 98  Resp:  [16-20] 18  SpO2:  [95 %-99 %] 97 %  BP: (136-158)/() 145/99     Weight: 70.1 kg (154 lb 10.4 oz)  Body mass index is 21.57 kg/m².  Body surface area is 1.87 meters squared.    ECOG SCORE           [unfilled]    Intake/Output - Last 3 Shifts         03/22 0700  03/23 0659 03/23 0700 03/24 0659 03/24 0700 03/25 0659    P.O. 450 1575 500    I.V. (mL/kg)  1440.5 (20.5)     Other       IV Piggyback 484.1 905.3     Total Intake(mL/kg) 934.1 (14.2) 3920.8 (55.9) 500 (7.1)    Urine (mL/kg/hr) 400 (0.3) 2550 (1.5)     Total Output 400 2550     Net +534.1 +1370.8 +500                    Physical Exam  Constitutional:       Appearance: Normal appearance.   HENT:      Head: Normocephalic and atraumatic.      Mouth/Throat:      Mouth: Mucous membranes are moist.      Pharynx: Oropharynx is clear.   Eyes:      Extraocular Movements: Extraocular movements intact.      Pupils: Pupils are equal, round, and reactive to light.   Cardiovascular:      Rate and Rhythm: Normal rate and regular rhythm.      Pulses: Normal pulses.      Heart sounds: Normal  heart sounds.   Pulmonary:      Effort: Pulmonary effort is normal.      Breath sounds: Normal breath sounds.   Abdominal:      General: Abdomen is flat. There is distension.      Palpations: Abdomen is soft.      Tenderness: There is no abdominal tenderness.   Musculoskeletal:         General: Normal range of motion.      Cervical back: Normal range of motion and neck supple.      Right lower leg: No edema.      Left lower leg: No edema.   Lymphadenopathy:      Cervical: Cervical adenopathy present.   Skin:     General: Skin is warm and dry.   Neurological:      General: No focal deficit present.      Mental Status: He is alert and oriented to person, place, and time.   Psychiatric:         Mood and Affect: Mood normal.         Behavior: Behavior normal.            Significant Labs:   All pertinent labs from the last 24 hours have been reviewed.    Diagnostic Results:  I have reviewed all pertinent imaging results/findings within the past 24 hours.  Assessment/Plan:     * Adult T-cell leukemia/lymphoma  Concern for acute leukemia at OSH  Wbc 37.6K, blast 45% on differential     - Trend cbc, cmp, coags,   - Peripheral smear, flow cytometry ordered   - BMB on 3/18 with  adult T-cell leukemia/lymphoma  - HTLV-1 Detected   - Prophylactic acyclovir, levofloxacin, and fluconzole   - Allopurinol 300mg daily, uric acid normal   - TTE with EF of 55-60%  - C1D2 EPOCH, tolerating well    Hepatitis B  - HB surface antigen and core antibody positive  - HBV DNA   - Hepatology consulted   - Tenofovir 25mg daily started per hepatology for chronic hep B infection   - HCV and HAV negative   - HDV in process       Hypercalcemia  Reported 19.3 at OSH s/p calcitonin  IU.     - S/P zometa and calcitonin   - Calcium normal    Hyperkalemia  - Experiencing pseudohyperkalemia given elevaetd WBC  - would avoid acting on K from BMP/CMP  - monitor with iSTAT and correlate with potential cardiac symptoms/telemetry    HTN  (hypertension)  Patient reports no history of HTN    -Hydralazine PRN    Thrombocytopenia  Anemia and thrombocytopenia    -transfuse plt < 10K or bleeding and <50    Tumor lysis syndrome  Uric acid 12, K 5.7 at OSH    - Allopurinol 300mg daily   - IVF as needed  - Uric acid now normal  - G6PD activity is 10% of mean normal, consistent with G6PD deficiency   - Avoid rasburicase         VTE Risk Mitigation (From admission, onward)           Ordered     heparin, porcine (PF) 100 unit/mL injection flush 500 Units  As needed (PRN)         03/23/24 1331     IP VTE LOW RISK PATIENT  Once         03/17/24 0336     Place sequential compression device  Until discontinued         03/17/24 0336                    Disposition: tbd    Jan Izaguirre MD  Bone Marrow Transplant  [facility] - [facility] ([facility])

## 2024-03-24 NOTE — NURSING
Critical value potassium of 7.1 called and received from lab. Dr. Mikel FAITH notified and 500 mL bolus of d10 was given as well as 10 units of IV insulin, calcium gluconate, and 10 g of oral lokelma given. Blood sugar being checked hourly. Repeat labs will be drawn at 0100 to trend potassium.

## 2024-03-24 NOTE — ASSESSMENT & PLAN NOTE
- Experiencing pseudohyperkalemia given elevaetd WBC  - would avoid acting on K from BMP/CMP  - monitor with iSTAT and correlate with potential cardiac symptoms/telemetry   no

## 2024-03-24 NOTE — PLAN OF CARE
Day 2 EPOCH. Patient AAOx4. POC reviewed with patient; understanding verbalized. Chemo infusing throughout shift. New bag hung @ end of shift by chemo certified RN. NS @ 50mL/hr. Potassium elevated this morning, MD notified. ISTAT ordered and potassium cam back @ 5.2, MD notified. MD did not want to correct potassium due to pseudo hyperkalemia and said we'll continue to monitor. Md notified about elevated BP in AM. PRN oxycodone given x1. No other complaints throughout shift. Bed alarm refused and family at bedside. Pt. with nonskid footwear on, bed in lowest position, and locked with bed rails up x2.  Pt. instructed to call for assistance as needed.  Pt. has call light and personal items within reach. Patient ambulates in room independently. Q 1hr rounding. VSS and afebrile this shift. All questions and concerns addressed at this time.

## 2024-03-24 NOTE — CODE/ RAPID DOCUMENTATION
RAPID RESPONSE NURSE ROUND       Rounding completed with charge RNJeff for lab results. No additional concerns verbalized at this time. Instructed to call [phone] for further concerns or assistance.

## 2024-03-24 NOTE — PLAN OF CARE
POC reviewed with patient; understanding verbalized. Telemetry monitoring in place; pt runs sinus tach. Pt denies any chest pain and shortness of breath. Q4 labs drawn until potassium reaches below 5.0. Blood sugar remains stable after potassium shift. NS @ 50 mL/hr. PRN oxy given 1x for pain management. Pt voids independently via urinal. Family to remain at bedside. Pt. with nonskid footwear on, bed in lowest position, and locked with bed rails up x2.  Pt. instructed to call prior to getting OOB.  Pt. has call light and personal items within reach. Patient ambulates in room independently. VSS and afebrile this shift. All questions and concerns addressed at this time. Will continue to monitor.

## 2024-03-25 NOTE — PROGRESS NOTES
[facility] - [facility] ([facility])  Hematology  Bone Marrow Transplant  Progress Note    Patient Name: Jose Armando Garg  Admission Date: 3/17/2024  Hospital Length of Stay: 8 days  Code Status: Full Code    Subjective:     Interval History: Day 3 EPOCH. LN in neck continue to decrease in size. WBC down to 50 from 87. Hg and platelets stable. LDH down trending. K 5.9 on BMP but patient has pseudohyperkalemia and K has been normal on iSTAT.     Objective:     Vital Signs (Most Recent):  Temp: 97.9 °F (36.6 °C) (03/25/24 0720)  Pulse: 91 (03/25/24 0720)  Resp: 18 (03/25/24 0720)  BP: (!) 150/96 (03/25/24 0720)  SpO2: 100 % (03/25/24 0720) Vital Signs (24h Range):  Temp:  [97.4 °F (36.3 °C)-98.2 °F (36.8 °C)] 97.9 °F (36.6 °C)  Pulse:  [81-98] 91  Resp:  [17-20] 18  SpO2:  [93 %-100 %] 100 %  BP: (118-154)/() 150/96     Weight: 70.1 kg (154 lb 10.4 oz)  Body mass index is 21.57 kg/m².  Body surface area is 1.87 meters squared.    ECOG SCORE           [unfilled]    Intake/Output - Last 3 Shifts         03/23 0700  03/24 0659 03/24 0700 03/25 0659 03/25 0700 03/26 0659    P.O. 1575 800     I.V. (mL/kg) 1440.5 (20.5) 1421 (20.3)     IV Piggyback 905.3      Total Intake(mL/kg) 3920.8 (55.9) 2221 (31.7)     Urine (mL/kg/hr) 2550 (1.5) 1875 (1.1)     Total Output 2550 1875     Net +1370.8 +346            Urine Occurrence  1 x              Physical Exam  Constitutional:       Appearance: Normal appearance.   HENT:      Head: Normocephalic and atraumatic.      Mouth/Throat:      Mouth: Mucous membranes are moist.      Pharynx: Oropharynx is clear.   Eyes:      Extraocular Movements: Extraocular movements intact.      Pupils: Pupils are equal, round, and reactive to light.   Cardiovascular:      Rate and Rhythm: Normal rate and regular rhythm.      Pulses: Normal pulses.      Heart sounds: Normal heart sounds.   Pulmonary:      Effort: Pulmonary effort is normal.      Breath sounds: Normal breath sounds.   Abdominal:      General:  Abdomen is flat. There is no distension.      Palpations: Abdomen is soft.      Tenderness: There is no abdominal tenderness.   Musculoskeletal:         General: Normal range of motion.      Cervical back: Normal range of motion and neck supple.      Right lower leg: No edema.      Left lower leg: No edema.   Lymphadenopathy:      Cervical: Cervical adenopathy present.   Skin:     General: Skin is warm and dry.   Neurological:      General: No focal deficit present.      Mental Status: He is alert and oriented to person, place, and time.   Psychiatric:         Mood and Affect: Mood normal.         Behavior: Behavior normal.            Significant Labs:   All pertinent labs from the last 24 hours have been reviewed.    Diagnostic Results:  I have reviewed all pertinent imaging results/findings within the past 24 hours.  Assessment/Plan:     * Adult T-cell leukemia/lymphoma  Concern for acute leukemia at OSH  Wbc 37.6K, blast 45% on differential     - Trend cbc, cmp, coags,   - Peripheral smear, flow cytometry ordered   - BMB on 3/18 with  adult T-cell leukemia/lymphoma  - HTLV-1 Detected   - Prophylactic acyclovir, levofloxacin, and fluconzole   - Allopurinol 300mg daily, uric acid normal   - TTE with EF of 55-60%  - C1D3 EPOCH, tolerating well, WBC and LDH down trending     Hepatitis B  - HB surface antigen and core antibody positive  - HBV DNA   - Hepatology consulted   - Tenofovir 25mg daily started per hepatology for chronic hep B infection   - HCV and HAV negative   - HDV negative       Hypercalcemia  Reported 19.3 at OSH s/p calcitonin  IU.     - S/P zometa and calcitonin   - Calcium normal    Hyperkalemia  - Experiencing pseudohyperkalemia given elevaetd WBC  - would avoid acting on K from BMP/CMP  - monitor with iSTAT and correlate with potential cardiac symptoms/telemetry    HTN (hypertension)  Patient reports no history of HTN    -Hydralazine PRN    Thrombocytopenia  Anemia and  thrombocytopenia    -transfuse plt < 10K or bleeding and <50    Tumor lysis syndrome  Uric acid 12, K 5.7 at OSH    - Allopurinol 300mg daily   - IVF as needed  - Uric acid now normal  - G6PD activity is 10% of mean normal, consistent with G6PD deficiency   - Avoid rasburicase         VTE Risk Mitigation (From admission, onward)           Ordered     heparin, porcine (PF) 100 unit/mL injection flush 500 Units  As needed (PRN)         03/23/24 1331     IP VTE LOW RISK PATIENT  Once         03/17/24 0336     Place sequential compression device  Until discontinued         03/17/24 0336                    Atul Blake MD  Bone Marrow Transplant  [facility] - Oncology ([facility])

## 2024-03-25 NOTE — ASSESSMENT & PLAN NOTE
- HB surface antigen and core antibody positive  - HBV DNA   - Hepatology consulted   - Tenofovir 25mg daily started per hepatology for chronic hep B infection   - HCV and HAV negative   - HDV negative

## 2024-03-25 NOTE — PROGRESS NOTES
The  assisted the patient with completing the patient section of the  medication request for assistance.

## 2024-03-25 NOTE — PLAN OF CARE
Day 3 EPOCH. Pt involved in plan of care and communicating needs throughout shift. Up in room and to bathroom independently; voiding without difficulty. Tolerating diet. Pain at BMB site managed with PO oxy. Telemetry maintained; normal sinus rhythm. Sterile change done at R PICC. Chemo infusing in one lumen, IVF @150 ml/hr infusing in other lumen. All VSS. Pt remaining free from falls or injury throughout shift. Bed locked and in lowest position, personal belongings and call light within reach, non skid socks on when OOB. Pt instructed to call for assistance as needed. Q2H rounding done on pt.

## 2024-03-25 NOTE — CARE UPDATE
RAPID RESPONSE NURSE ROUND       Rounding completed with charge RN, Isa for K 7.0 and WBC 86.99 reports team is aware and states it is pseudohyperkalemia. Pt started on chemo today. No additional concerns verbalized at this time. Instructed to call [phone] for further concerns or assistance.

## 2024-03-25 NOTE — SUBJECTIVE & OBJECTIVE
Subjective:     Interval History: Day 3 EPOCH. LN in neck continue to decrease in size. WBC down to 50 from 87. Hg and platelets stable. LDH down trending. K 5.9 on BMP but patient has pseudohyperkalemia and K has been normal on iSTAT.     Objective:     Vital Signs (Most Recent):  Temp: 97.9 °F (36.6 °C) (03/25/24 0720)  Pulse: 91 (03/25/24 0720)  Resp: 18 (03/25/24 0720)  BP: (!) 150/96 (03/25/24 0720)  SpO2: 100 % (03/25/24 0720) Vital Signs (24h Range):  Temp:  [97.4 °F (36.3 °C)-98.2 °F (36.8 °C)] 97.9 °F (36.6 °C)  Pulse:  [81-98] 91  Resp:  [17-20] 18  SpO2:  [93 %-100 %] 100 %  BP: (118-154)/() 150/96     Weight: 70.1 kg (154 lb 10.4 oz)  Body mass index is 21.57 kg/m².  Body surface area is 1.87 meters squared.    ECOG SCORE           [unfilled]    Intake/Output - Last 3 Shifts         03/23 0700  03/24 0659 03/24 0700 03/25 0659 03/25 0700 03/26 0659    P.O. 1575 800     I.V. (mL/kg) 1440.5 (20.5) 1421 (20.3)     IV Piggyback 905.3      Total Intake(mL/kg) 3920.8 (55.9) 2221 (31.7)     Urine (mL/kg/hr) 2550 (1.5) 1875 (1.1)     Total Output 2550 1875     Net +1370.8 +346            Urine Occurrence  1 x              Physical Exam  Constitutional:       Appearance: Normal appearance.   HENT:      Head: Normocephalic and atraumatic.      Mouth/Throat:      Mouth: Mucous membranes are moist.      Pharynx: Oropharynx is clear.   Eyes:      Extraocular Movements: Extraocular movements intact.      Pupils: Pupils are equal, round, and reactive to light.   Cardiovascular:      Rate and Rhythm: Normal rate and regular rhythm.      Pulses: Normal pulses.      Heart sounds: Normal heart sounds.   Pulmonary:      Effort: Pulmonary effort is normal.      Breath sounds: Normal breath sounds.   Abdominal:      General: Abdomen is flat. There is no distension.      Palpations: Abdomen is soft.      Tenderness: There is no abdominal tenderness.   Musculoskeletal:         General: Normal range of motion.       Cervical back: Normal range of motion and neck supple.      Right lower leg: No edema.      Left lower leg: No edema.   Lymphadenopathy:      Cervical: Cervical adenopathy present.   Skin:     General: Skin is warm and dry.   Neurological:      General: No focal deficit present.      Mental Status: He is alert and oriented to person, place, and time.   Psychiatric:         Mood and Affect: Mood normal.         Behavior: Behavior normal.            Significant Labs:   All pertinent labs from the last 24 hours have been reviewed.    Diagnostic Results:  I have reviewed all pertinent imaging results/findings within the past 24 hours.

## 2024-03-25 NOTE — PLAN OF CARE
AAOX4. Nonskid footwear on with bed locked and low, Bed rails up x2.  Ambulates independently and instructed to call if assistance is needed,denies pain, remains afebrile this shift. Call light and personal items within reach, family at bedside. Pt voices no other concerns throughout shift.

## 2024-03-25 NOTE — ASSESSMENT & PLAN NOTE
Concern for acute leukemia at OSH  Wbc 37.6K, blast 45% on differential     - Trend cbc, cmp, coags,   - Peripheral smear, flow cytometry ordered   - BMB on 3/18 with  adult T-cell leukemia/lymphoma  - HTLV-1 Detected   - Prophylactic acyclovir, levofloxacin, and fluconzole   - Allopurinol 300mg daily, uric acid normal   - TTE with EF of 55-60%  - C1D3 EPOCH, tolerating well, WBC and LDH down trending

## 2024-03-26 NOTE — TREATMENT PLAN
Hepatology Treatment Plan    Jose Armando Garg is a 43 y.o. male admitted to hospital 3/17/2024 (Hospital Day: 10) due to Adult T-cell leukemia/lymphoma.     Interval History    LFT's continue to improve.     Objective  Temp:  [97.4 °F (36.3 °C)-98.6 °F (37 °C)] 98.6 °F (37 °C) (03/26 0730)  Pulse:  [53-94] 91 (03/26 0730)  BP: (128-177)/() 129/79 (03/26 0730)  Resp:  [17-20] 18 (03/26 1038)  SpO2:  [96 %-100 %] 100 % (03/26 0730)    Laboratory    Lab Results   Component Value Date    WBC 24.55 (H) 03/26/2024    HGB 9.8 (L) 03/26/2024    HCT 30.1 (L) 03/26/2024    MCV 85 03/26/2024    PLT 51 (L) 03/26/2024       Lab Results   Component Value Date     (L) 03/25/2024    K 5.9 (H) 03/25/2024     03/25/2024    CO2 28 03/25/2024    BUN 38 (H) 03/25/2024    CREATININE 0.7 03/25/2024    CALCIUM 8.9 03/25/2024       Lab Results   Component Value Date    ALBUMIN 2.6 (L) 03/26/2024    ALT 48 (H) 03/26/2024    AST 76 (H) 03/26/2024    ALKPHOS 212 (H) 03/26/2024    BILITOT 1.1 (H) 03/26/2024       Lab Results   Component Value Date    INR 1.2 03/26/2024    INR 1.4 (H) 03/24/2024    INR 1.5 (H) 03/23/2024       MELD 3.0: 11 at 3/26/2024  8:14 AM  MELD-Na: 9 at 3/26/2024  8:14 AM  Calculated from:  Serum Creatinine: 0.7 mg/dL (Using min of 1 mg/dL) at 3/25/2024  1:41 AM  Serum Sodium: 135 mmol/L at 3/25/2024  1:41 AM  Total Bilirubin: 1.1 mg/dL at 3/26/2024  4:35 AM  Serum Albumin: 2.6 g/dL at 3/26/2024  4:35 AM  INR(ratio): 1.2 at 3/26/2024  8:14 AM  Age at listing (hypothetical): 43 years  Sex: Male at 3/26/2024  8:14 AM      Assessment    Jose Armando Garg is a 43 y.o. male with no known past medical history who presented to [facility] as a transfer from [facility] on 03/16/2024 with altered mental status, weakness and abdominal pain.  There was concern for acute leukemia based on his blood counts and a bone marrow biopsy was performed, diagnosed with adult T-cell leukemia/lymphoma.  Incidentally found to have  elevated LFTs (hepatocellular pattern) and labs suggestive of chronic hepatitis-B.     Patient is an immigrant from [place] and does not recollect being diagnosed with hepatitis-B in the past.  No high-risk activities.  On physical exam, no stigmata of chronic liver disease.     Problem List:  Chronic Hep B, Immune Active  Immune to Hep A     Pertinent workup  ==============  HIV nonreactive  Hep A IgM nonreactive  Hep A IgG Reactive   Hep B surface antigen positive  Hep B core total antibody reactive  HBV DNA   Hepatitis C antibody negative     US liver hepatomegaly with satisfactory Doppler evaluation      Plan  - Continue Vemlidy 25 mg QD     - Encouraged patient to share his diagnosis with his wife and household members so that they can be screened, and vaccinated if possible.       - please obtain daily CBC, CMP     - Plan of care was discussed with primary team. We will follow peripherally.     - We will arrange for follow up in hepatology clinic following discharge in the coming months with labs     Thank you for involving us in the care of Jose Armando Garg. Please call with any additional concerns or questions.    We will sign off.     Bria Zarate DO   Gastroenterology Fellow PGY- IV  [facility]

## 2024-03-26 NOTE — PLAN OF CARE
Day 4 of EPOCH. Doxorubicin infusing. Tolerating well. Carvedilol started for elevated BP. Pt stated he and his wife received bad news yesterday and is possibly the cause for spike in BP. IVF infusing. Back pain managed with oxycodone x1. Tessalon perles given for cough. Nonskid footwear worn with bed in lowest position and locked. Bed rails up x2. Pt ambulates independently and instructed to call if assistance is needed. Wife at bedside. Call light within reach. Will continue to monitor.

## 2024-03-26 NOTE — PROGRESS NOTES
[facility] ([facility])  Hematology  Bone Marrow Transplant  Progress Note    Patient Name: Jose Armando Garg  Admission Date: 3/17/2024  Hospital Length of Stay: 9 days  Code Status: Full Code    Subjective:     Interval History: Day 4 EPOCH. WBC down to 24.5. Hg and platelets stable. LFTs and LDH improving. Started on coreg for elevated BP. Getting cryo of fibrinogen of 125. CMP stable.     Objective:     Vital Signs (Most Recent):  Temp: 98.6 °F (37 °C) (03/26/24 0730)  Pulse: 91 (03/26/24 0730)  Resp: 18 (03/26/24 0730)  BP: 129/79 (03/26/24 0730)  SpO2: 100 % (03/26/24 0730) Vital Signs (24h Range):  Temp:  [97.4 °F (36.3 °C)-98.6 °F (37 °C)] 98.6 °F (37 °C)  Pulse:  [53-94] 91  Resp:  [17-20] 18  SpO2:  [96 %-100 %] 100 %  BP: (128-177)/() 129/79     Weight: 70.1 kg (154 lb 10.4 oz)  Body mass index is 21.57 kg/m².  Body surface area is 1.87 meters squared.    ECOG SCORE           [unfilled]    Intake/Output - Last 3 Shifts         03/24 0700  03/25 0659 03/25 0700 03/26 0659 03/26 0700 03/27 0659    P.O. 800 360     I.V. (mL/kg) 1421 (20.3) 2833.2 (40.4)     IV Piggyback       Total Intake(mL/kg) 2221 (31.7) 3193.2 (45.6)     Urine (mL/kg/hr) 1875 (1.1) 1950 (1.2)     Stool  0     Total Output 1875 1950     Net +346 +1243.2            Urine Occurrence 1 x 1 x     Stool Occurrence  0 x              Physical Exam  Constitutional:       Appearance: Normal appearance.   HENT:      Head: Normocephalic and atraumatic.      Mouth/Throat:      Mouth: Mucous membranes are moist.      Pharynx: Oropharynx is clear.   Eyes:      Extraocular Movements: Extraocular movements intact.      Pupils: Pupils are equal, round, and reactive to light.   Cardiovascular:      Rate and Rhythm: Normal rate and regular rhythm.      Pulses: Normal pulses.      Heart sounds: Normal heart sounds.   Pulmonary:      Effort: Pulmonary effort is normal.      Breath sounds: Normal breath sounds.   Abdominal:      General: Abdomen is flat.  There is no distension.      Palpations: Abdomen is soft.      Tenderness: There is no abdominal tenderness.   Musculoskeletal:         General: Normal range of motion.      Cervical back: Normal range of motion and neck supple.      Right lower leg: No edema.      Left lower leg: No edema.   Lymphadenopathy:      Cervical: Cervical adenopathy present.   Skin:     General: Skin is warm and dry.   Neurological:      General: No focal deficit present.      Mental Status: He is alert and oriented to person, place, and time.   Psychiatric:         Mood and Affect: Mood normal.         Behavior: Behavior normal.            Significant Labs:   All pertinent labs from the last 24 hours have been reviewed.    Diagnostic Results:  I have reviewed all pertinent imaging results/findings within the past 24 hours.  Assessment/Plan:     * Adult T-cell leukemia/lymphoma  Concern for acute leukemia at OSH  Wbc 37.6K, blast 45% on differential     - Trend cbc, cmp, coags,   - Peripheral smear, flow cytometry ordered   - BMB on 3/18 with  adult T-cell leukemia/lymphoma  - HTLV-1 Detected   - Cryo for fibrinogen <150   - Prophylactic acyclovir, levofloxacin, and fluconzole   - Allopurinol 300mg daily, uric acid normal   - TTE with EF of 55-60%  - C1D4 EPOCH, tolerating well, WBC and LDH down trending     Hepatitis B  - HB surface antigen and core antibody positive  - HBV DNA   - Hepatology consulted   - Tenofovir 25mg daily started per hepatology for chronic hep B infection   - HCV and HAV negative   - HDV negative       Hypercalcemia  Reported 19.3 at OSH s/p calcitonin  IU.     - S/P zometa and calcitonin   - Calcium normal    Hyperkalemia  - Experiencing pseudohyperkalemia given elevaetd WBC  - would avoid acting on K from BMP/CMP  - monitor with iSTAT and correlate with potential cardiac symptoms/telemetry    HTN (hypertension)  Patient reports no history of HTN    - Coreg 6.25mg BID started on  3/25    Thrombocytopenia  Anemia and thrombocytopenia    -transfuse plt < 10K or bleeding and <50    Tumor lysis syndrome  Uric acid 12, K 5.7 at OSH    - Allopurinol 300mg daily   - IVF as needed  - Uric acid now normal  - G6PD activity is 10% of mean normal, consistent with G6PD deficiency   - Avoid rasburicase         VTE Risk Mitigation (From admission, onward)           Ordered     heparin, porcine (PF) 100 unit/mL injection flush 500 Units  As needed (PRN)         03/23/24 1331     IP VTE LOW RISK PATIENT  Once         03/17/24 0336     Place sequential compression device  Until discontinued         03/17/24 0336                    Atul Blake MD  Bone Marrow Transplant  [facility] ([facility])

## 2024-03-26 NOTE — PROGRESS NOTES
"[facility] ([facility])  Adult Nutrition  Progress Note    SUMMARY       Recommendations    1. Continue Regular Diet. Encourage high-calorie, high-protein diet. Please honor patient preferences as able. Continue encouragement at meals/snacks.   2. Modify ONS Boost Plus (or alternative) QD.   3. Monitor I/O's, weight and labs.   4. Replete P.   5. RD to monitor.    Goals: Meet % EEN/EPN by follow-up date.  Nutrition Goal Status: new  Communication of RD Recs: other (comment) (POC)    Assessment and Plan    Nutrition Problem  Increased nutrient needs (energy/protein)    Related to (etiology):   Increased physiological demands    Signs and Symptoms (as evidenced by):   Adult T-cell leukemia/lymphoma     Interventions/Recommendations (treatment strategy):  Collaboration of nutrition care with other providers  ONS    Nutrition Diagnosis Status:   New      Reason for Assessment    Reason For Assessment: length of stay  Diagnosis:  (Adult T-cell leukemia/lymphoma)  Relevant Medical History: HTN  Interdisciplinary Rounds: did not attend  General Information Comments: LOS. Tolerating regular diet. Good appetite consuming % at meals. No issues with N/V/C/D, denies difficulty chewing/swallowing. Patient states he would like more fish. Would like to decrease frequency of ONS delivered. UBW ~145 lbs.  Nutrition Discharge Planning: Pending Clinical Course    Nutrition Risk Screen    Nutrition Risk Screen: no indicators present    Nutrition/Diet History    Patient Reported Diet/Restrictions/Preferences: general  Typical Food/Fluid Intake: 3-6 meals per day  Spiritual, Cultural Beliefs, Restoration Practices, Values that Affect Care: no  Food Allergies: NKFA  Factors Affecting Nutritional Intake: None identified at this time    Anthropometrics    Temp: 97.7 °F (36.5 °C)  Height: 5' 11" (180.3 cm)  Height (inches): 71 in  Weight Method: Standard Scale  Weight: 70.1 kg (154 lb 10.4 oz)  Weight (lb): 154.65 lb  Ideal " Body Weight (IBW), Male: 172 lb  % Ideal Body Weight, Male (lb): 84.6 %  BMI (Calculated): 21.6       Lab/Procedures/Meds    Pertinent Labs Reviewed: reviewed  Pertinent Labs Comments: H/H: 9.8/30.1, P 2.0, , TP 4.7, TBili 1.1, AST 76, ALT 48, Bilirubin direct 0.6  Pertinent Medications Reviewed: reviewed  Pertinent Medications Comments: carvedilol, allopurinol, levofloxacin, olanzapine, prednisone, ondansetron      Estimated/Assessed Needs    Weight Used For Calorie Calculations: 69.9 kg (154 lb)  Energy Calorie Requirements (kcal): 2576-1783 kcal/d (25-35 kcal/kg)  Energy Need Method: Kcal/kg  Protein Requirements: 70-98 g/d (1.0-1.4 g/kg)  Weight Used For Protein Calculations: 69.9 kg (154 lb)        RDA Method (mL): 1746         Nutrition Prescription Ordered    Current Diet Order: Regular  Oral Nutrition Supplement: Boost Plus    Evaluation of Received Nutrient/Fluid Intake    I/O: +1.24 L  Energy Calories Required: meeting needs  Protein Required: meeting needs  Comments: LBM: 3/26  Tolerance: tolerating  % Intake of Estimated Energy Needs: 75 - 100 %  % Meal Intake: 75 - 100 %    Nutrition Risk    Level of Risk/Frequency of Follow-up: low (1x/ week)     Monitor and Evaluation    Food and Nutrient Intake: energy intake, food and beverage intake  Food and Nutrient Adminstration: diet order  Physical Activity and Function: nutrition-related ADLs and IADLs, factors affecting access to physical activity  Anthropometric Measurements: weight, weight change, body mass index  Biochemical Data, Medical Tests and Procedures: inflammatory profile, glucose/endocrine profile, gastrointestinal profile, electrolyte and renal panel, lipid profile  Nutrition-Focused Physical Findings: skin, head and eyes, extremities, muscles and bones, overall appearance     Nutrition Follow-Up    RD Follow-up?: Yes    Adry Crocker Registration Eligible, Provisional LDN

## 2024-03-26 NOTE — SUBJECTIVE & OBJECTIVE
Subjective:     Interval History: Day 4 EPOCH. WBC down to 24.5. Hg and platelets stable. LFTs and LDH improving. Started on coreg for elevated BP. Getting cryo of fibrinogen of 125. CMP stable.     Objective:     Vital Signs (Most Recent):  Temp: 98.6 °F (37 °C) (03/26/24 0730)  Pulse: 91 (03/26/24 0730)  Resp: 18 (03/26/24 0730)  BP: 129/79 (03/26/24 0730)  SpO2: 100 % (03/26/24 0730) Vital Signs (24h Range):  Temp:  [97.4 °F (36.3 °C)-98.6 °F (37 °C)] 98.6 °F (37 °C)  Pulse:  [53-94] 91  Resp:  [17-20] 18  SpO2:  [96 %-100 %] 100 %  BP: (128-177)/() 129/79     Weight: 70.1 kg (154 lb 10.4 oz)  Body mass index is 21.57 kg/m².  Body surface area is 1.87 meters squared.    ECOG SCORE           [unfilled]    Intake/Output - Last 3 Shifts         03/24 0700  03/25 0659 03/25 0700  03/26 0659 03/26 0700 03/27 0659    P.O. 800 360     I.V. (mL/kg) 1421 (20.3) 2833.2 (40.4)     IV Piggyback       Total Intake(mL/kg) 2221 (31.7) 3193.2 (45.6)     Urine (mL/kg/hr) 1875 (1.1) 1950 (1.2)     Stool  0     Total Output 1875 1950     Net +346 +1243.2            Urine Occurrence 1 x 1 x     Stool Occurrence  0 x              Physical Exam  Constitutional:       Appearance: Normal appearance.   HENT:      Head: Normocephalic and atraumatic.      Mouth/Throat:      Mouth: Mucous membranes are moist.      Pharynx: Oropharynx is clear.   Eyes:      Extraocular Movements: Extraocular movements intact.      Pupils: Pupils are equal, round, and reactive to light.   Cardiovascular:      Rate and Rhythm: Normal rate and regular rhythm.      Pulses: Normal pulses.      Heart sounds: Normal heart sounds.   Pulmonary:      Effort: Pulmonary effort is normal.      Breath sounds: Normal breath sounds.   Abdominal:      General: Abdomen is flat. There is no distension.      Palpations: Abdomen is soft.      Tenderness: There is no abdominal tenderness.   Musculoskeletal:         General: Normal range of motion.      Cervical back:  Normal range of motion and neck supple.      Right lower leg: No edema.      Left lower leg: No edema.   Lymphadenopathy:      Cervical: Cervical adenopathy present.   Skin:     General: Skin is warm and dry.   Neurological:      General: No focal deficit present.      Mental Status: He is alert and oriented to person, place, and time.   Psychiatric:         Mood and Affect: Mood normal.         Behavior: Behavior normal.            Significant Labs:   All pertinent labs from the last 24 hours have been reviewed.    Diagnostic Results:  I have reviewed all pertinent imaging results/findings within the past 24 hours.

## 2024-03-26 NOTE — NURSING
Chemotherapy Note:  Consent & CAR in chart. CAR & BSA verified by two chemo certified Rns. Nursing communication in chart with okay to treat even with elevated labs. Pre-medicated with IVP zofran. DOXOrubicin (ADRIAMYCIN) 10 mg, etoposide (VEPESID) 46 mg, vinCRIStine (ONCOVIN) 0.4 mg in sodium chloride 0.9% 500 mL chemo infusion  and patient verified at bedside by two chemo certified RNs, Jodi.  Positive blood noted to right arm PICC. DOXOrubicin (ADRIAMYCIN) 10 mg, etoposide (VEPESID) 46 mg, vinCRIStine (ONCOVIN) 0.4 mg in sodium chloride 0.9% 500 mL chemo infusion IVPB administered to right arm PICC over 24 hrs. Chemotherapy precautions maintained & patient educated.  Will continue to monitor.

## 2024-03-26 NOTE — PLAN OF CARE
Recommendations    1. Continue Regular Diet. Encourage high-calorie, high-protein diet. Please honor patient preferences as able. Continue encouragement at meals/snacks.   2. Modify ONS Boost Plus (or alternative) QD.   3. Monitor I/O's, weight and labs.   4. Replete P.   5. RD to monitor.    Goals: Meet % EEN/EPN by follow-up date.  Nutrition Goal Status: new  Communication of RD Recs: other (comment) (POC)

## 2024-03-26 NOTE — ASSESSMENT & PLAN NOTE
Concern for acute leukemia at OSH  Wbc 37.6K, blast 45% on differential     - Trend cbc, cmp, coags,   - Peripheral smear, flow cytometry ordered   - BMB on 3/18 with  adult T-cell leukemia/lymphoma  - HTLV-1 Detected   - Cryo for fibrinogen <150   - Prophylactic acyclovir, levofloxacin, and fluconzole   - Allopurinol 300mg daily, uric acid normal   - TTE with EF of 55-60%  - C1D4 EPOCH, tolerating well, WBC and LDH down trending

## 2024-03-26 NOTE — PLAN OF CARE
C/o back pain from biopsy site, relieved with prn oxy. Nonskid footwear on with bed locked and low, bed rails up x2. Ambulates independently and instructed to call if assistance is needed, voiced understanding. Call light and personal items within reach. family at bedside.

## 2024-03-27 NOTE — PROGRESS NOTES
[facility] ([facility])  Hematology  Bone Marrow Transplant  Progress Note    Patient Name: Jose Armando Garg  Admission Date: 3/17/2024  Hospital Length of Stay: 10 days  Code Status: Full Code    Subjective:     Interval History:  Day 5 EPOCH. WBC down to 11.8. Hg and platelets stable. Mag, Cr, and phos low, will replace. LDH improving. Patient has some LLE from all the IVF but it is improving.     Objective:     Vital Signs (Most Recent):  Temp: 98.1 °F (36.7 °C) (03/27/24 0733)  Pulse: 100 (03/27/24 0910)  Resp: 18 (03/27/24 0733)  BP: (!) 155/96 (03/27/24 0733)  SpO2: 100 % (03/27/24 0733) Vital Signs (24h Range):  Temp:  [97.5 °F (36.4 °C)-98.7 °F (37.1 °C)] 98.1 °F (36.7 °C)  Pulse:  [] 100  Resp:  [17-20] 18  SpO2:  [96 %-100 %] 100 %  BP: (149-178)/() 155/96     Weight: 70.1 kg (154 lb 10.4 oz)  Body mass index is 21.57 kg/m².  Body surface area is 1.87 meters squared.    ECOG SCORE           [unfilled]    Intake/Output - Last 3 Shifts         03/25 0700  03/26 0659 03/26 0700  03/27 0659 03/27 0700  03/28 0659    P.O. 360 780     I.V. (mL/kg) 2833.2 (40.4) 3251.3 (46.4)     Blood  249.8     Total Intake(mL/kg) 3193.2 (45.6) 4281.1 (61.1)     Urine (mL/kg/hr) 1950 (1.2) 2900 (1.7)     Stool 0 0     Total Output 1950 2900     Net +1243.2 +1381.1            Urine Occurrence 1 x      Stool Occurrence 0 x 1 x              Physical Exam  Constitutional:       Appearance: Normal appearance.   HENT:      Head: Normocephalic and atraumatic.      Mouth/Throat:      Mouth: Mucous membranes are moist.      Pharynx: Oropharynx is clear.   Eyes:      Extraocular Movements: Extraocular movements intact.      Pupils: Pupils are equal, round, and reactive to light.   Cardiovascular:      Rate and Rhythm: Normal rate and regular rhythm.      Pulses: Normal pulses.      Heart sounds: Normal heart sounds.   Pulmonary:      Effort: Pulmonary effort is normal.      Breath sounds: Normal breath sounds.   Abdominal:       General: Abdomen is flat. There is no distension.      Palpations: Abdomen is soft.      Tenderness: There is no abdominal tenderness.   Musculoskeletal:         General: Normal range of motion.      Cervical back: Normal range of motion and neck supple.      Right lower leg: No edema.      Left lower leg: No edema.   Lymphadenopathy:      Cervical: Cervical adenopathy (improving) present.   Skin:     General: Skin is warm and dry.   Neurological:      General: No focal deficit present.      Mental Status: He is alert and oriented to person, place, and time.   Psychiatric:         Mood and Affect: Mood normal.         Behavior: Behavior normal.            Significant Labs:   All pertinent labs from the last 24 hours have been reviewed.    Diagnostic Results:  I have reviewed all pertinent imaging results/findings within the past 24 hours.  Assessment/Plan:     * Adult T-cell leukemia/lymphoma  Concern for acute leukemia at OSH  Wbc 37.6K, blast 45% on differential     - Trend cbc, cmp, coags,   - Peripheral smear, flow cytometry ordered   - BMB on 3/18 with  adult T-cell leukemia/lymphoma  - HTLV-1 Detected   - Cryo for fibrinogen <150   - Prophylactic acyclovir, atovaquone, levofloxacin, and fluconzole   - Allopurinol 300mg daily, uric acid normal   - TTE with EF of 55-60%  - C1D5 EPOCH, tolerating well, WBC and LDH down trending     Hepatitis B  - HB surface antigen and core antibody positive  - HBV DNA   - Hepatology consulted   - Tenofovir 25mg daily started per hepatology for chronic hep B infection   - HCV and HAV negative   - HDV negative       Hypercalcemia  Reported 19.3 at OSH s/p calcitonin  IU.     - S/P zometa and calcitonin   - Calcium normal    Hyperkalemia  - Experiencing pseudohyperkalemia given elevaetd WBC  - would avoid acting on K from BMP/CMP  - monitor with iSTAT and correlate with potential cardiac symptoms/telemetry    HTN (hypertension)  Patient reports no history of HTN    -  Coreg started on 3/25    Thrombocytopenia  Anemia and thrombocytopenia    -transfuse plt < 10K or bleeding and <50    Tumor lysis syndrome  Uric acid 12, K 5.7 at OSH    - Allopurinol 300mg daily   - IVF as needed  - Uric acid now normal  - G6PD activity is 10% of mean normal, consistent with G6PD deficiency   - Avoid rasburicase         VTE Risk Mitigation (From admission, onward)           Ordered     heparin, porcine (PF) 100 unit/mL injection flush 500 Units  As needed (PRN)         03/23/24 1331     IP VTE LOW RISK PATIENT  Once         03/17/24 0336     Place sequential compression device  Until discontinued         03/17/24 0336                    Atul Blake MD  Bone Marrow Transplant  [facility] ([facility])

## 2024-03-27 NOTE — PLAN OF CARE
Day 5 of EPOCH. Doxorubicin infusing. Tolerating well. Elevated BP; Scheduled Coreg given. Otherwise, VSS. IVF infusing. Back pain managed with oxycodone x1. No acute events overnight. Nonskid footwear worn with bed in lowest position and locked. Bed rails up x2. Pt ambulates independently and instructed to call if assistance is needed. Wife at bedside. Call light within reach. Will continue to monitor.

## 2024-03-27 NOTE — PLAN OF CARE
Medical Information faxed to [facility] in [place] in hopes to get patient established with oncology (Dr Mac Cabrales) closer to his home.     [facility]  Phone: [phone]  Fax: [phone]    STEVEN Barillas (Dee)  Inpatient Oncology Navigator  Ext: [phone]

## 2024-03-27 NOTE — SUBJECTIVE & OBJECTIVE
Subjective:     Interval History:  Day 5 EPOCH. WBC down to 11.8. Hg and platelets stable. Mag, Cr, and phos low, will replace. LDH improving. Patient has some LLE from all the IVF but it is improving.     Objective:     Vital Signs (Most Recent):  Temp: 98.1 °F (36.7 °C) (03/27/24 0733)  Pulse: 100 (03/27/24 0910)  Resp: 18 (03/27/24 0733)  BP: (!) 155/96 (03/27/24 0733)  SpO2: 100 % (03/27/24 0733) Vital Signs (24h Range):  Temp:  [97.5 °F (36.4 °C)-98.7 °F (37.1 °C)] 98.1 °F (36.7 °C)  Pulse:  [] 100  Resp:  [17-20] 18  SpO2:  [96 %-100 %] 100 %  BP: (149-178)/() 155/96     Weight: 70.1 kg (154 lb 10.4 oz)  Body mass index is 21.57 kg/m².  Body surface area is 1.87 meters squared.    ECOG SCORE           [unfilled]    Intake/Output - Last 3 Shifts         03/25 0700 03/26 0659 03/26 0700 03/27 0659 03/27 0700 03/28 0659    P.O. 360 780     I.V. (mL/kg) 2833.2 (40.4) 3251.3 (46.4)     Blood  249.8     Total Intake(mL/kg) 3193.2 (45.6) 4281.1 (61.1)     Urine (mL/kg/hr) 1950 (1.2) 2900 (1.7)     Stool 0 0     Total Output 1950 2900     Net +1243.2 +1381.1            Urine Occurrence 1 x      Stool Occurrence 0 x 1 x              Physical Exam  Constitutional:       Appearance: Normal appearance.   HENT:      Head: Normocephalic and atraumatic.      Mouth/Throat:      Mouth: Mucous membranes are moist.      Pharynx: Oropharynx is clear.   Eyes:      Extraocular Movements: Extraocular movements intact.      Pupils: Pupils are equal, round, and reactive to light.   Cardiovascular:      Rate and Rhythm: Normal rate and regular rhythm.      Pulses: Normal pulses.      Heart sounds: Normal heart sounds.   Pulmonary:      Effort: Pulmonary effort is normal.      Breath sounds: Normal breath sounds.   Abdominal:      General: Abdomen is flat. There is no distension.      Palpations: Abdomen is soft.      Tenderness: There is no abdominal tenderness.   Musculoskeletal:         General: Normal range of motion.       Cervical back: Normal range of motion and neck supple.      Right lower leg: No edema.      Left lower leg: No edema.   Lymphadenopathy:      Cervical: Cervical adenopathy (improving) present.   Skin:     General: Skin is warm and dry.   Neurological:      General: No focal deficit present.      Mental Status: He is alert and oriented to person, place, and time.   Psychiatric:         Mood and Affect: Mood normal.         Behavior: Behavior normal.            Significant Labs:   All pertinent labs from the last 24 hours have been reviewed.    Diagnostic Results:  I have reviewed all pertinent imaging results/findings within the past 24 hours.

## 2024-03-27 NOTE — ASSESSMENT & PLAN NOTE
Concern for acute leukemia at OSH  Wbc 37.6K, blast 45% on differential     - Trend cbc, cmp, coags,   - Peripheral smear, flow cytometry ordered   - BMB on 3/18 with  adult T-cell leukemia/lymphoma  - HTLV-1 Detected   - Cryo for fibrinogen <150   - Prophylactic acyclovir, atovaquone, levofloxacin, and fluconzole   - Allopurinol 300mg daily, uric acid normal   - TTE with EF of 55-60%  - C1D5 EPOCH, tolerating well, WBC and LDH down trending

## 2024-03-27 NOTE — NURSING
Consent & CAR in chart. CAR & BSA verified by two chemo certified Rns. Premedicated with Zofran. Chemo infusion and patient verified at bedside by two chemo certified Rns. Positive blood noted to right arm PICC. cycloPHOSphamide 750 mg/m2 = 1,360 mg in sodium chloride 0.9% 291.8 mL chemo infusion administered to right arm PICC over 30 minutes at 583.6ml/hr. Chemotherapy precautions maintained & patient educated.

## 2024-03-27 NOTE — NURSING
Chemotherapy Note:  Consent & CAR in chart. CAR & BSA verified by two chemo certified Rns.  Pre-medicated with IVP zofran. DOXOrubicin (ADRIAMYCIN) 10 mg, etoposide (VEPESID) 46 mg, vinCRIStine (ONCOVIN) 0.4 mg in sodium chloride 0.9% 500 mL chemo infusion  and patient verified at bedside by two chemo certified RNs, Dipak.  Positive blood noted to right arm PICC. DOXOrubicin (ADRIAMYCIN) 10 mg, etoposide (VEPESID) 46 mg, vinCRIStine (ONCOVIN) 0.4 mg in sodium chloride 0.9% 500 mL chemo infusion IVPB administered to right arm PICC over 24 hrs. Chemotherapy precautions maintained & patient educated.  Will continue to monitor.

## 2024-03-27 NOTE — PLAN OF CARE
Day 5 of EPOCH; no acute events this shift. Afebrile & VSS on room air. No PRNs needed. IVF continue to infuse as ordered. Chemo continues as scheduled. Electrolytes closely monitored magnesium, phosphorous, and calcium replaced per orders. Ambulates independently to bathroom; educated on importance of I/O recording. CHG wipes provided and encouraged use. Tolerating diet, voiding without difficulty.  Pt remaining free from falls or injury throughout shift; bed locked and in lowest position; call light within reach. POC reviewed with patient and fiance at bedside. All needs addressed. Frequent rounding performed.

## 2024-03-27 NOTE — PROGRESS NOTES
The patient was screened for traditional Medicaid, but when he applied he provided the wrong Social Security Number, (SSN). One week later the patient provided the correct SSN, which triggered Medicaid to request a copy of the physical Social Security Card. The patient did not have access to the card because, as the patient stated, the card is in [facility] in a safe.  The patient stated that he can provide proof-or-residency with  a Lease Agreement indicating his address at [address] [place] [address]. This document can be used to be screened for Financial Assistance.  Prior to applying for [facility] Financial Assistance, Medicaid need to deny the application.  The patient and his significant other stated that they need to be discharged no later than Friday, 03/29/2024. The patient's significant other sister passed.  The Medicaid office will notify the , as soon as, the Medicaid application is denied.

## 2024-03-28 NOTE — PROGRESS NOTES
Assumed care of pt from 1845-0715.     - A/Ox4  - VSS on RA  - Pain managed w/ PRN oxy  - PICC flushes w/o difficulty; +BR; saline locked  - Ambulating independently in the room w/ steady gait  - Voiding w/o difficulty  - Family at the bedside  - Call bell within reach    Chemo completed and disposed of by chemo certified charge RN.    No acute events overnight. POC discussed - pt verbalizes understanding and denies questions/concerns at this time.     At the conclusion of this shift, pt is observed to be resting comfortably w/ unlabored breathing and exhibits no s/s of distress. Bed is in lowest position and locked. POC followed.

## 2024-03-28 NOTE — TELEPHONE ENCOUNTER
Patient currently hospitalized.  Appt with PA Scheuermann scheduled 4/26/24; reminder notice mailed.

## 2024-03-28 NOTE — PROGRESS NOTES
Pharmacist Discharge Patient Education Note    Patient was counseled and given information regarding medication changes at discharge.        Medication List        START taking these medications      acyclovir 200 MG capsule  Commonly known as: ZOVIRAX  Take 2 capsules (400 mg total) by mouth 2 (two) times daily.     allopurinoL 300 MG tablet  Commonly known as: ZYLOPRIM  Take 1 tablet (300 mg total) by mouth once daily.  Start taking on: March 29, 2024     atovaquone 750 mg/5 mL Susp oral liquid  Commonly known as: MEPRON  Take 10 mLs (1,500 mg total) by mouth once daily.  Start taking on: March 29, 2024     benzonatate 100 MG capsule  Commonly known as: TESSALON  Take 1 capsule (100 mg total) by mouth 3 (three) times daily as needed for Cough.     calcium carbonate 200 mg calcium (500 mg) chewable tablet  Commonly known as: TUMS  Take 2 tablets (1,000 mg total) by mouth 2 (two) times daily.     carvediloL 12.5 MG tablet  Commonly known as: COREG  Take 1 tablet (12.5 mg total) by mouth 2 (two) times daily.     dextromethorphan-guaiFENesin  mg/5 ml  mg/5 mL liquid  Commonly known as: ROBITUSSIN-DM  Take 5 mLs by mouth every 4 (four) hours as needed.     oxyCODONE 5 MG immediate release tablet  Commonly known as: ROXICODONE  Take 1 tablet (5 mg total) by mouth every 6 (six) hours as needed for Pain.     prochlorperazine 10 MG tablet  Commonly known as: COMPAZINE  Take 1 tablet (10 mg total) by mouth 4 (four) times daily as needed (nausea).     senna-docusate 8.6-50 mg 8.6-50 mg per tablet  Commonly known as: PERICOLACE  Take 1 tablet by mouth daily as needed for Constipation.     tenofovir alafenamide 25 mg Tab  Commonly known as: VEMLIDY  Take 1 tablet (25 mg total) by mouth once daily.               Where to Get Your Medications        These medications were sent to [facility] - [place], [place] - [address]  [address], [facility] [address]      Phone: [phone]    tenofovir alafenamide 25 mg Tab       These medications were sent to [facility]  [address] [facility] [address]      Hours: Mon-Fri 7a-7p, Sat-Sun 10a-4p Phone: [phone]   acyclovir 200 MG capsule  allopurinoL 300 MG tablet  atovaquone 750 mg/5 mL Susp oral liquid  benzonatate 100 MG capsule  calcium carbonate 200 mg calcium (500 mg) chewable tablet  carvediloL 12.5 MG tablet  dextromethorphan-guaiFENesin  mg/5 ml  mg/5 mL liquid  oxyCODONE 5 MG immediate release tablet  prochlorperazine 10 MG tablet       Information about where to get these medications is not yet available    Ask your nurse or doctor about these medications  senna-docusate 8.6-50 mg 8.6-50 mg per tablet            The discharge medication regimen as listed above was reviewed with the patient.       The following changes were discussed in detail:  Reviewed new medications acyclovir and atovaquone with the patient. Discussed the importance of taking these medications in order to help prevent viral and PJP infections.   Reviewed new medication Vemlidy with the patient. Confirmed that patient was notified that medication will be sent to the patient's house on 03/26/24.        The patient had the opportunity to ask questions and express concerns. All questions were answered.       Ximena Garrett, PharmD  Clinical Pharmacist-BMT/hematology  [facility]

## 2024-03-28 NOTE — PLAN OF CARE
AAOx4, POC reviewed & verbalizes understanding.  PICC line D/C'd.   Meds to Bed delivered.   Afebrile. No acute events on shift. No deficits noted  Bed locked in lowest position, side rails up x2, call light within reach, environment clear. Encouraged pt to call nurse with any concerns.  Safety measures maintained  Discharged to home.

## 2024-03-28 NOTE — PLAN OF CARE
Referral sent to Oncology Referral to be sent to [facility] (P) [phone] (F) [phone] and Outpatient Lab. Standing Lab to be drawn on Monday / Thursday Start 4/1/2024 through 5/3/2024 - CBC, CMP, T/S, MAG, PHOS.     STEVEN Barillas (Dee)  Inpatient Oncology Navigator  Ext: [phone]

## 2024-03-28 NOTE — PROGRESS NOTES
The  set-up the patient reservation at the [facility] with a check-in on 04/15/2024 and a check-out on 04/20/2024. The  [facility] to [facility] a cost transfer in the amount of 493.50.

## 2024-03-28 NOTE — TELEPHONE ENCOUNTER
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  We started him on Vemlidy.  He will need follow up with an JAD in 2 months.  (This is direction per Dr. Keene.) I will also order labs under Dr. Keene.   Thanks   Lucie.

## 2024-03-28 NOTE — CARE UPDATE
BMT Care Update     Oncology Referral to be sent to [facility] [place] (P) [phone] (F) [phone] and Standing Outpatient Lab. Lab to be drawn on Monday / Thursday Start 4/1/2024 through 5/3/2024 - CBC, CMP, T/S, MAG, PHOS.     Atul Blake MD  Hematology/Oncology Fellow PGY-IV

## 2024-03-28 NOTE — DISCHARGE SUMMARY
[facility] - Oncology ([facility])  Hematology  Bone Marrow Transplant  Discharge Summary      Patient Name: Jose Armando Garg  MRN: [id number]  Admission Date: 3/17/2024  Hospital Length of Stay: 11 days  Discharge Date and Time:  03/28/2024 4:34 PM  Attending Physician: Adolph Ignacio MD   Discharging Provider: Atul Blake MD  Primary Care Provider: Jessica, Primary Doctor    HPI: Patient is a 44 y/o male who presented to [facility] on 3/16/24 for AMS, weakness, and abdominal pain.     [place] [facility] [place] hypertensive, afebrile, saturating on RA.    He is lethargic, but arousable. Able to answer limited yes or no questions. Lives with wife at home. Per outside records wife brought him in to ED due to confusion, not answering questions appropriately and weakness.      He reports subjective fevers, dry cough, no hemoptysis, no hematuria, no melana.   Reports decrease appetite for a few weeks.      Labs at OSH: wbc 37.6K, blast 45%, hgb 15.7 g/dL, plt 58K, Na 131, K 5.7, Cr 0.6, uric acid 12, Ca+ 19.3, Bili 2, UA negative, COVID negative.     At OSH ED: Patient given normal saline bolus 3L, hydralazine 20mg, calcitonin  IU, morphine 4mg, zofran 4mg, vancomycin 1gm and zosyn 2.25 gm.    * No surgery found *     Hospital Course: Patient was transferred for concerns of acute leukemia given increased blasts on CBC. He was started on prophylactic allopurinol, acyclovir, levofloxacin, and fluconazole. Had significant AMS secondary to a calcium up to 19. After treatment of calcium his mental status improved to baseline. Uric acid initially elevated but improved with IVF, held off on rasburicase and patient eventually found to be G6PD deficient. CT with cervical adenopathy and splenomegaly. 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 He was started on EPOCH while inpatient and tolerated it well. WBC improved with  treatments. Labs also revealed chronic hepatitis B infection, hepatology consulted and patient was started on tenofovir. Patient is not a citizen of the [place] but is a permanent, he is in the process of getting medicaid at this time. He will need to follow with [facility], ambulatory referral sent. Will speak with our navigator to help establish care. He was discharged on 3/18.     Physical Exam  Constitutional:       Appearance: Normal appearance.   HENT:      Head: Normocephalic and atraumatic.      Mouth/Throat:      Mouth: Mucous membranes are moist.      Pharynx: Oropharynx is clear.   Eyes:      Extraocular Movements: Extraocular movements intact.      Pupils: Pupils are equal, round, and reactive to light.   Cardiovascular:      Rate and Rhythm: Normal rate and regular rhythm.      Pulses: Normal pulses.      Heart sounds: Normal heart sounds.   Pulmonary:      Effort: Pulmonary effort is normal.      Breath sounds: Normal breath sounds.   Abdominal:      General: Abdomen is flat. There is no distension.      Palpations: Abdomen is soft.      Tenderness: There is no abdominal tenderness.   Musculoskeletal:         General: Normal range of motion.      Cervical back: Normal range of motion and neck supple.      Right lower leg: No edema.      Left lower leg: No edema.   Lymphadenopathy:      Cervical: Cervical adenopathy (improving) present.   Skin:     General: Skin is warm and dry.   Neurological:      General: No focal deficit present.      Mental Status: He is alert and oriented to person, place, and time.   Psychiatric:         Mood and Affect: Mood normal.         Behavior: Behavior normal.     Goals of Care Treatment Preferences:  Code Status: Full Code      Consults (From admission, onward)          Status Ordering Provider     Inpatient consult to Hepatology  Once        Provider:  (Not yet assigned)    BON Nielsen     [facility]  Once        Provider:  (Not  yet assigned)    Completed WINNIE DEWITT            Significant Diagnostic Studies: N/A    Pending Diagnostic Studies:       Procedure Component Value Units Date/Time    APTT [[id number]] Collected: 03/23/24 0452    Order Status: Sent Lab Status: No result     Specimen: Blood     Fibrinogen [[id number]] Collected: 03/23/24 0452    Order Status: Sent Lab Status: No result     Specimen: Blood     Flow Cytometry Analysis (Peripheral Blood) [[id number]] Collected: 03/17/24 0912    Order Status: Sent Lab Status: In process Updated: 03/18/24 0831    Specimen: Blood     Narrative:      Collection has been rescheduled by CAR1 at 03/17/2024 05:36 Reason:   Unable to collect. 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] 03/17/2024 No    Hepatitis B [B19.10] 03/20/2024 Yes    Tumor lysis syndrome [E88.3] 03/17/2024 No    Thrombocytopenia [D69.6] 03/17/2024 Yes    HTN (hypertension) [I10] 03/17/2024 Yes    Hyperkalemia [E87.5] 03/17/2024 No    Hypercalcemia [E83.52] 03/17/2024 Yes      Problems Resolved During this Admission:    Diagnosis Date Noted Date Resolved POA    Portal hypertension [K76.6] 03/17/2024 03/17/2024 No    AMS (altered mental status) [R41.82] 03/17/2024 03/24/2024 Yes      Discharged Condition: stable    Disposition: Home or Self Care    Follow Up:    Patient Instructions:      CBC W/ AUTO DIFFERENTIAL   Standing Status: Standing Number of Occurrences: 4 Standing Exp. Date: 06/26/25     COMPREHENSIVE METABOLIC PANEL   Standing Status: Standing Number of Occurrences: 4 Standing Exp. Date: 06/26/25     Magnesium   Standing Status: Standing Number of Occurrences: 4  Standing Exp. Date: 06/26/25     Phosphorus   Standing Status: Standing Number of Occurrences: 4 Standing Exp. Date: 06/26/25     COMPREHENSIVE METABOLIC PANEL   Standing Status: Standing Number of Occurrences: 4 Standing Exp. Date: 06/26/25     CBC W/ AUTO DIFFERENTIAL   Standing Status: Standing Number of Occurrences: 4 Standing Exp. Date: 06/26/25     Magnesium   Standing Status: Standing Number of Occurrences: 4 Standing Exp. Date: 06/26/25     PHOSPHORUS   Standing Status: Standing Number of Occurrences: 4 Standing Exp. Date: 06/26/25     Ambulatory referral/consult to Interventional RAD   Standing Status: Future   Referral Priority: Routine Referral Type: Consultation   Number of Visits Requested: 1     Ambulatory referral/consult to Hematology / Oncology   Standing Status: Future   Referral Priority: Routine Referral Type: Consultation   Referral Reason: Specialty Services Required   Requested Specialty: Hematology and Oncology   Number of Visits Requested: 1     Diet Adult Regular     Notify your health care provider if you experience any of the following:  temperature >100.4     Notify your health care provider if you experience any of the following:  persistent nausea and vomiting or diarrhea     Notify your health care provider if you experience any of the following:  severe uncontrolled pain     Notify your health care provider if you experience any of the following:  redness, tenderness, or signs of infection (pain, swelling, redness, odor or green/yellow discharge around incision site)     Notify your health care provider if you experience any of the following:  difficulty breathing or increased cough     Notify your health care provider if you experience any of the following:  severe persistent headache     Notify your health care provider if you experience any of the following:  persistent dizziness, light-headedness, or visual disturbances     Notify your health care provider if you experience any  of the following:  increased confusion or weakness     Type & Screen   Standing Status: Standing Number of Occurrences: 4 Standing Exp. Date: 06/26/25     Type & Screen   Standing Status: Standing Number of Occurrences: 4 Standing Exp. Date: 06/26/25     Activity as tolerated     Medications:  Reconciled Home Medications:      Medication List        START taking these medications      acyclovir 200 MG capsule  Commonly known as: ZOVIRAX  Take 2 capsules (400 mg total) by mouth 2 (two) times daily.     allopurinoL 300 MG tablet  Commonly known as: ZYLOPRIM  Take 1 tablet (300 mg total) by mouth once daily.  Start taking on: March 29, 2024     atovaquone 750 mg/5 mL Susp oral liquid  Commonly known as: MEPRON  Take 10 mLs (1,500 mg total) by mouth once daily.  Start taking on: March 29, 2024     benzonatate 100 MG capsule  Commonly known as: TESSALON  Take 1 capsule (100 mg total) by mouth 3 (three) times daily as needed for Cough.     ANALY-GEST ANTACID 200 mg calcium (500 mg) chewable tablet  Generic drug: calcium carbonate  Chew and swallow 2 tablets (1,000 mg total) by mouth 2 (two) times daily.     carvediloL 12.5 MG tablet  Commonly known as: COREG  Take 1 tablet (12.5 mg total) by mouth 2 (two) times daily.     dextromethorphan-guaiFENesin  mg/5 ml  mg/5 mL liquid  Commonly known as: ROBITUSSIN-DM  Take 5 mLs by mouth every 4 (four) hours as needed.     oxyCODONE 5 MG immediate release tablet  Commonly known as: ROXICODONE  Take 1 tablet (5 mg total) by mouth every 6 (six) hours as needed for Pain.     prochlorperazine 5 MG tablet  Commonly known as: COMPAZINE  Take 2 tablets (10 mg total) by mouth 4 (four) times daily as needed (nausea).     senna-docusate 8.6-50 mg 8.6-50 mg per tablet  Commonly known as: PERICOLACE  Take 1 tablet by mouth daily as needed for Constipation.     tenofovir alafenamide 25 mg Tab  Commonly known as: VEMLIDY  Take 1 tablet (25 mg total) by mouth once daily.               Atul Blake MD  Bone Marrow Transplant  [facility] - Oncology ([facility])

## 2024-03-28 NOTE — HPI
Patient is a 44 y/o male who presented to [facility] on 3/16/24 for AMS, weakness, and abdominal pain.     On arrival to [facility] patient is hypertensive, afebrile, saturating on RA.    He is lethargic, but arousable. Able to answer limited yes or no questions. Lives with wife at home. Per outside records wife brought him in to ED due to confusion, not answering questions appropriately and weakness.      He reports subjective fevers, dry cough, no hemoptysis, no hematuria, no melana.   Reports decrease appetite for a few weeks.      Labs at OSH: wbc 37.6K, blast 45%, hgb 15.7 g/dL, plt 58K, Na 131, K 5.7, Cr 0.6, uric acid 12, Ca+ 19.3, Bili 2, UA negative, COVID negative.     At OSH ED: Patient given normal saline bolus 3L, hydralazine 20mg, calcitonin  IU, morphine 4mg, zofran 4mg, vancomycin 1gm and zosyn 2.25 gm.

## 2024-04-02 PROBLEM — D70.9 NEUTROPENIA: Status: ACTIVE | Noted: 2024-01-01

## 2024-04-02 NOTE — PROGRESS NOTES
"Called and spoke to [facility] BMT nurse navigator.  She has an appt scheduled with Dr Ottoniel Wilks today at 1:30 in [place].  She will call to notify pt.    Jazmin Alas MD Miller, Umeka A, RN; Dayron Ochoa RN; Atul Blake MD; Evelia Momin MD; Xi Ley MD; 1 other  Cc: Adolph Ignacio MD; Azam Duque, KATINAW  Caller: Unspecified (5 days ago,  4:45 PM)  Referral placed          Previous Messages       ----- Message -----  From: Yasmin Amaya RN  Sent: 4/2/2024  10:30 AM CDT  To: Dayron Ochoa RN; Jazmin Alas MD; *  Subject: RE: Urgent Follow-Up                            Hello, can someone please put a referral in the EPIC system for us to see this patient in [place]? Please and thank you.  ----- Message -----  From: Dayron Ochoa RN  Sent: 4/2/2024  10:17 AM CDT  To: Jazmin Alas MD; Evelia Momin MD; *  Subject: RE: Urgent Follow-Up                            I spoke to Yasmin (BMT [place] in [place]) and she can get pt in to see Dr Xi Ley [facility].  [facility] pt stated they are on their way to the ED in [place] to get a "Shot" Neupogen (may need 1 each day for 3 days).  Please advise. Should pt go to ED or See Dr Xi Ley  today?  ----- Message -----  From: Atul Blake MD  Sent: 3/28/2024   4:54 PM CDT  To: Dayron Ochoa RN; Jazmin Alas MD; *  Subject: Urgent Follow-Up                                Dayron Ochoa,    Shakir afternoon. This patient was just diagnosed with acute leukemia and received inpatient chemotherapy. He was born in the [place] but has lived in the  for the last 20 years. He states he is a permanent resident, has a social security card, and is trying to get medicaid but is currently uninsured. He will need to establish care with [facility] ASAP to resume his care. I sent an ambulatory referral but was wondering if you could help expedite the " follow-up process. Thanks!    Regards,  Dr. Blake

## 2024-04-02 NOTE — PROGRESS NOTES
The  reached out to the patient by phone [facility] [place]and [facility] [facility].   [facility] oncologist, Dr.Mindy Comfort MD has agreed accept the patient for treatment. This arrangement was successfully completed between Dr. Liliane MD, and Dr. Shelley Moyer MD, and Dr. Jazmin Alas MD.  The  called the patient several times unsuccessfully. There was no answer on the patient's phone, and the  left messages, and on the patient's significant other's phone there was no answer, and the  couldn't leave a message because the message box was full.   The  will try to reach the patient later today.

## 2024-04-02 NOTE — PROGRESS NOTES
Received office visit from pt and pt significant other to be assisted with a gas card. Provided pt with 2/$20.00 gas cards-([phone]/ [phone]) from the [employer] Gas Card program to assist with transportation. No other needs were noted at that time. Will continue to follow pt and assist as needed.       Ilene Jacobson LMSW    Ext [phone]/Pager [phone]

## 2024-04-03 NOTE — PROGRESS NOTES
Received office visit from pt to provide his information to apply for Medicaid. Pt was informed that he have to speak with the Financial Counselor(Surya Giron) for further assistance with applying for Medicaid and was provided with his contact number. Pt verbalized understanding. Pt requested assistance with a gas card. Provided pt with 1/$20.00 gas card-([phone]) from the [employer] Gas Card program to assist with transportation. No other needs were noted at that time. Will continue to follow pt and assist.       Ilene Jacobson LMSW    Ext [phone]/Pager [phone]

## 2024-04-04 NOTE — PROGRESS NOTES
Pt requested assistance with a gas card. Provided pt with 1/$20.00 gas card-([phone]) from the [employer] Gas Card program to assist with transportation. No other needs were noted at that time. Will continue to follow pt and assist as needed.       Ilene Jacobson LMSW    Ext [phone]/Pager [phone]

## 2024-04-08 NOTE — TELEPHONE ENCOUNTER
Left message for pt to return my call. Need to schedule IR procedure.  Please forward call to [phone]. Thanks

## 2024-04-09 NOTE — PROGRESS NOTES
Received office visit from pt and pt significant other requesting assistance with a gas card. Completed the [facility] [employer] Application and faxed to [email or web address] to assist pt with a gas card via mail. No other needs were noted at that time. Will continue to follow pt and assist as needed.       Ilene Jacobson LMSW    Ext [phone]/Pager [phone]

## 2024-04-11 PROBLEM — N17.9 AKI (ACUTE KIDNEY INJURY): Status: ACTIVE | Noted: 2024-01-01

## 2024-04-11 PROBLEM — Z99.11 ON MECHANICALLY ASSISTED VENTILATION: Status: ACTIVE | Noted: 2024-01-01

## 2024-04-14 PROBLEM — Z51.5 COMFORT MEASURES ONLY STATUS: Status: ACTIVE | Noted: 2024-01-01

## 2024-04-15 PROBLEM — C91.50: Status: ACTIVE | Noted: 2024-01-01
